# Patient Record
Sex: MALE | Race: BLACK OR AFRICAN AMERICAN | Employment: FULL TIME | URBAN - METROPOLITAN AREA
[De-identification: names, ages, dates, MRNs, and addresses within clinical notes are randomized per-mention and may not be internally consistent; named-entity substitution may affect disease eponyms.]

---

## 2017-09-01 ENCOUNTER — OFFICE VISIT (OUTPATIENT)
Dept: SURGERY | Age: 47
End: 2017-09-01

## 2017-09-01 VITALS
HEIGHT: 69 IN | BODY MASS INDEX: 41.62 KG/M2 | SYSTOLIC BLOOD PRESSURE: 150 MMHG | HEART RATE: 103 BPM | TEMPERATURE: 98.3 F | RESPIRATION RATE: 18 BRPM | OXYGEN SATURATION: 98 % | WEIGHT: 281 LBS | DIASTOLIC BLOOD PRESSURE: 90 MMHG

## 2017-09-01 DIAGNOSIS — G47.33 OSA TREATED WITH BIPAP: ICD-10-CM

## 2017-09-01 DIAGNOSIS — K44.9 HIATAL HERNIA WITH GERD: ICD-10-CM

## 2017-09-01 DIAGNOSIS — K21.9 CHRONIC GERD: ICD-10-CM

## 2017-09-01 DIAGNOSIS — K21.9 HIATAL HERNIA WITH GERD: ICD-10-CM

## 2017-09-01 DIAGNOSIS — I10 ESSENTIAL HYPERTENSION: ICD-10-CM

## 2017-09-01 DIAGNOSIS — E66.01 MORBID OBESITY WITH BMI OF 40.0-44.9, ADULT (HCC): Primary | ICD-10-CM

## 2017-09-01 RX ORDER — DEXLANSOPRAZOLE 60 MG/1
60 CAPSULE, DELAYED RELEASE ORAL
COMMUNITY

## 2017-09-01 RX ORDER — CARVEDILOL 12.5 MG/1
12.5 TABLET ORAL
COMMUNITY
End: 2018-06-14

## 2017-09-01 RX ORDER — SERTRALINE HYDROCHLORIDE 100 MG/1
100 TABLET, FILM COATED ORAL DAILY
COMMUNITY

## 2017-09-01 RX ORDER — FLUTICASONE PROPIONATE 50 MCG
2 SPRAY, SUSPENSION (ML) NASAL DAILY
COMMUNITY
End: 2018-06-14

## 2017-09-01 RX ORDER — RANITIDINE 150 MG/1
150 TABLET, FILM COATED ORAL
COMMUNITY
End: 2018-06-14

## 2017-09-01 RX ORDER — HYDROCHLOROTHIAZIDE 25 MG/1
25 TABLET ORAL
COMMUNITY
End: 2018-07-18

## 2017-09-01 RX ORDER — ALPRAZOLAM 0.5 MG/1
0.5 TABLET ORAL
COMMUNITY
End: 2018-06-14

## 2017-09-01 RX ORDER — LISINOPRIL 40 MG/1
40 TABLET ORAL
Status: ON HOLD | COMMUNITY
End: 2018-07-17

## 2017-09-01 RX ORDER — ALLOPURINOL 300 MG/1
300 TABLET ORAL DAILY
COMMUNITY

## 2017-09-01 RX ORDER — TRAZODONE HYDROCHLORIDE 100 MG/1
100 TABLET ORAL
COMMUNITY

## 2017-09-01 RX ORDER — SIMVASTATIN 10 MG/1
10 TABLET, FILM COATED ORAL DAILY
COMMUNITY

## 2017-09-01 RX ORDER — NIFEDIPINE 90 MG/1
90 TABLET, FILM COATED, EXTENDED RELEASE ORAL
COMMUNITY
End: 2018-06-14

## 2017-09-01 RX ORDER — GUAIFENESIN, PSEUDOEPHEDRINE HYDROCHLORIDE 600; 60 MG/1; MG/1
1 TABLET, EXTENDED RELEASE ORAL EVERY 12 HOURS
COMMUNITY
End: 2018-06-14

## 2017-09-01 NOTE — PROGRESS NOTES
HISTORY OF PRESENT ILLNESS  Matteo Todd is a 40 y.o. male. HPI   Chief Complaint   Patient presents with    New Patient     interested in gastric bypass     Camille Eisenmenger is a 40 y.o. male that presents today for evaluation for weight loss surgery. He has attended an informational seminar and has been researching for several years. He was evaluated and went through the process in [place], but it was not covered by his insurance. He \"thinks\" his sister has had WLS, but does not have much contact with her. He has several friends and co-workers that have had the surgery. He has had a prior Nissen fundoplication and still has terrible GERD. He is interested in Malabsorptive gastric bypass for treatment of morbid obesity. Body mass index is 41.5 kg/(m^2).     \"Chubby\" as a child   Age 13 \"packed it on\"   Joined the [employer] off me\"; left after 6 years     Max weight loss about 45 lbs with low carb plan   High weight  295 lbs (2011)  Low weight  172 lbs (in the [employer])    3 meals per day; uses [employer] vs eating out; struggles with portions   Drinks mostly diet soda, sugar free add on, coffee 20 oz, water   Patient Active Problem List   Diagnosis Code    Morbid obesity with BMI of 40.0-44.9, adult ([facility] E66.01, Z68.41    Chronic GERD K21.9     Past Medical History:   Diagnosis Date    Anxiety     Fine esophagus     last EGD 2015     Dyspepsia and other specified disorders of function of stomach     Hypertension     CHONG (obstructive sleep apnea)     on BiPap     Past Surgical History:   Procedure Laterality Date    HX GI      [place] fundoplication Dr. Darylene Needles in [address]  2009    hiatal    HX ORTHOPAEDIC  2004    left ankle reconstruction    HX ORTHOPAEDIC  2005    left pinkie    HX RHINOPLASTY       Social History     Social History    Marital status:      Spouse name: N/A    Number of children: 2    Years of education: N/A     Occupational History    contract spec      fed [employer] / some work from home      Social History Main Topics    Smoking status: Never Smoker    Smokeless tobacco: Never Used    Alcohol use Yes      Comment: 1 x month or less     Drug use: No    Sexual activity: Yes     Partners: Female     Other Topics Concern    Not on file     Social History Narrative    In the home with spouse         Family History   Problem Relation Age of Onset    Cancer Mother     Diabetes Mother     Hypertension Mother     Hypertension Father        Current Outpatient Prescriptions:     allopurinol (ZYLOPRIM) 300 mg tablet, Take 300 mg by mouth., Disp: , Rfl:     lisinopril (PRINIVIL, ZESTRIL) 40 mg tablet, Take 40 mg by mouth., Disp: , Rfl:     carvedilol (COREG) 12.5 mg tablet, Take 12.5 mg by mouth., Disp: , Rfl:     NIFEdipine ER (ADALAT CC) 90 mg ER tablet, Take 90 mg by mouth., Disp: , Rfl:     hydroCHLOROthiazide (HYDRODIURIL) 25 mg tablet, Take 25 mg by mouth., Disp: , Rfl:     Dexlansoprazole 60 mg CpDB, Take 60 mg by mouth., Disp: , Rfl:     ALPRAZolam (XANAX) 0.5 mg tablet, Take 0.5 mg by mouth., Disp: , Rfl:     simvastatin (ZOCOR) 10 mg tablet, Take 10 mg by mouth., Disp: , Rfl:     sertraline (ZOLOFT) 100 mg tablet, Take 100 mg by mouth., Disp: , Rfl:     traZODone (DESYREL) 100 mg tablet, Take 100 mg by mouth., Disp: , Rfl:     PSEUDOEPHEDRINE-guaiFENesin (MUCINEX D)  mg per tablet, Take 1 Tab by mouth every twelve (12) hours. , Disp: , Rfl:     fluticasone (FLONASE) 50 mcg/actuation nasal spray, 2 Sprays by Both Nostrils route daily. , Disp: , Rfl:     raNITIdine (ZANTAC) 150 mg tablet, Take 150 mg by mouth nightly., Disp: , Rfl:   No Known Allergies  VERA Larson    Review of Systems   Constitutional: Positive for malaise/fatigue (always tired ). HENT: Positive for congestion, sinus pain and tinnitus. Negative for ear discharge, ear pain, hearing loss, nosebleeds and sore throat.     Eyes: Positive for blurred vision (glasses ). Negative for double vision, photophobia, pain, discharge and redness. Respiratory: Positive for cough (during sleep ) and shortness of breath (cause I am out of shape ). Negative for hemoptysis, sputum production and wheezing. On BiPap, but issues with sinus and can only use 3x/ week currently    Cardiovascular: Positive for chest pain (r/t GERD ). Negative for palpitations and leg swelling. Gastrointestinal: Positive for abdominal pain (with food stuck feeling 1x month ), diarrhea (\"just me with anxiety\" ), heartburn and vomiting (regurgitates if \"stuck in throat\"). Negative for blood in stool, constipation, melena and nausea. Dysphagia with apples, breads, pasta, rice   Had EGD about 2 years ago and has been dilated in the past.  Was told Nissen had \"slipped\" and he has recurrent hiatal hernia with history of Fine's    Genitourinary: Negative. Musculoskeletal: Positive for back pain (low back ), joint pain (weight bearing joints, L>R; injured arch left foot ) and myalgias. Negative for falls and neck pain. Skin: Rash: on thighs and dry skin    Neurological: Positive for dizziness (usually related to sinuses and more vertigo ), tingling (depends on \"how I sit\") and headaches (frequent between tension, BP and sinuses ). Negative for seizures and loss of consciousness. Endo/Heme/Allergies: Negative. Psychiatric/Behavioral: Positive for depression. Negative for hallucinations, memory loss, substance abuse and suicidal ideas. The patient is nervous/anxious (some PTSD, work stress ) and has insomnia (trazodome helps ). Physical Exam   Constitutional: He is oriented to person, place, and time. No distress. AA male unaccompanied   /90 (BP 1 Location: Left arm, BP Patient Position: Sitting)  Pulse (!) 103  Temp 98.3 °F (36.8 °C)  Resp 18  Ht 5' 9\" (1.753 m)  Wt 281 lb (127.5 kg)  SpO2 98%  BMI 41.5 kg/m2     Neck: Neck supple. No thyromegaly present. Cardiovascular: Regular rhythm and normal heart sounds. Pulmonary/Chest: Effort normal and breath sounds normal. No respiratory distress. Abdominal: Soft. Bowel sounds are normal. He exhibits no distension. There is no tenderness. Well healed laparoscopy scars    Musculoskeletal: He exhibits no edema. Ambulating independently    Neurological: He is alert and oriented to person, place, and time. Skin: Skin is warm and dry. He is not diaphoretic. Psychiatric: He has a normal mood and affect. Very pleasant and informed        ASSESSMENT and PLAN    ICD-10-CM ICD-9-CM    1. Morbid obesity with BMI of 40.0-44.9, adult ([facility] E66.01 278.01     Z68.41 V85.41    2. BMI 40.0-44.9, adult ([facility] Z68.41 V85.41    3. Hiatal hernia with GERD K21.9 530.81     K44.9 553.3    4. CHONG treated with BiPAP G47.33 327.23    5. Essential hypertension I10 401.9    6. Chronic GERD K21.9 530.81      Anthony Gurjit meets criteria established by the NIH. Without weight reduction, co-morbidities will escalate as well as risk of early mortality. Recommendation is patient could be served with surgical weight reduction, the procedure of Malabsorptive gastric bypass for treatment of morbid obesity. With his history of Nissen fundoplication, GERD and Barretts, gastric bypass is his preferred and recommended procedure. I explained to the patient differences between laparoscopic gastric bypass and sleeve gastrectomy procedures. Patient has attended one our informational meeting and has seen our educational materials. Patient desires to have surgery with Koko Garcia MD.    The procedure of divided gastric bypass with Anna-en-Y gastrojejunostomy was explained to the patient including the four parts of the operation- 1) loss of appetite, 2) the restrictive phases, 3) the dumping phase, 4) mild malabsorption from the diversion of pancreatic or biliary enzymes.  Informed consent was obtained concerning the potential morbidities and mortality of the operation including anastomotic leak, pulmonary embolism, deep vein thrombosis, bleeding, staple- line disruption, stomal stenosis or dilatation, inadequate weight loss, and requirement for life-long vitamin intake. Further information was given concerning a three-day hospitalization with at least one or more nights in a special care unit, protein- enriched liquified diet for two-thee weeks, convalescence for three to six weeks. Information was provided concerning the team approach anesthesia, nursing, and dietary. Pneumatic stockings, Heparin or Lovenox, and wound care management techniques were explained. Abdominal pain, nausea and/or vomiting may occur if eating too fast, too much, or not chewing well enough. With sweets or high fat ingestion, dumping may occur. It was further stressed the approximately three to five percent of patients require endoscopic balloon dilatation of the staple line. Furthermore, a clear discussion of the imperfections of the operation was discussed including the fact that it not effective in every patient because of patient non-compliance and/or mechanical failure. It was hoped, however, that at approaching a ninety percent level, the patients can achieve a mean of seventy percent loss of excess body weight. This is determined partially by patient compliance and exercise as well as making wise choices for the diet. Recommendations:    _x__ Nutrition Evaluation    _x__ Psychological Evaluation    ___ Cardiac Evaluation    ___ Pulmonary Evaluation    ___ Sleep Medicine     ___ [facility]     ___ Committee    _x__ UGI    _x__ EGD     I have reinforced without lifestyle change and behavior modification, Faustina Baumgarten would not achieve his weight loss goals. I reviewed risks and complications associated with each procedure. I discussed  a diet high in protein, low-fat, low- sugar, limited carbohydrates, and discontinuing use of carbonated beverages. Also discussed physical activity and exercises. I have answered all questions, they wish to proceed. Stuart Rushing verbalized understanding and questions were answered to the best of my knowledge and ability. Gastric Bypass educational materials were provided. 51 minutes spent in face to face with J Carlos Walsh > 50% counseling.

## 2017-09-01 NOTE — PATIENT INSTRUCTIONS
Melonie Arias, our surgical coordinator will contact you about the 3 month diet program with the dietician Colette Patel RD)    You will be contacted about an UGI and upper endoscopy to look at your anatomy     You can schedule the psychological evaluation at any time         Learning About Bariatric Surgery  What is bariatric surgery? Bariatric surgery is surgery to help you lose weight. This type of surgery is only used for people who are very overweight and have not been able to lose weight with diet and exercise. This surgery makes the stomach smaller. Some types of surgery also change the connection between your stomach and intestines. How is bariatric surgery done? Bariatric surgery may be either \"open\" or \"laparoscopic. \" Open surgery is done through a large cut (incision) in the belly. Laparoscopic surgery is done through several small cuts. The doctor puts a lighted tube, or scope, and other surgical tools through small cuts in your belly. The doctor is able to see your organs with the scope. There are different types of bariatric surgery. Gastric sleeve surgery  The surgery is usually done through several small incisions in the belly. The doctor removes more than half of your stomach. This leaves a thin sleeve, or tube, that is about the size of a banana. Because part of your stomach has been removed, this can't be reversed. Anna-en-Y gastric bypass surgery  Anna-en-Y (say \"mono-en-why\") surgery changes the connection between the stomach and the intestines. The doctor separates a section of your stomach from the rest of your stomach. This makes a small pouch. The new pouch will hold the food you eat. The doctor connects the stomach pouch to the middle part of the small intestine. Gastric banding surgery  The surgery is usually done through several small incisions in the belly. The doctor wraps a band around the upper part of the stomach. This creates a small pouch.  The small size of the pouch means that you will get full after you eat just a small amount of food. The doctor can inflate or deflate the band to adjust the size. This lets the doctor adjust how quickly food passes from the new pouch into the stomach. It does not change the connection between the stomach and the intestines. What can you expect after the surgery? You may stay in the hospital for one or more days after the surgery. How long you stay depends on the type of surgery you had. Most people need 2 to 4 weeks before they are ready to get back to their usual routine. For the first 2 to 6 weeks after surgery, you probably will need to follow a liquid or soft diet. Bit by bit, you will be able to eat more solid foods. Your doctor may advise you to work with a dietitian. This way you'll be sure to get enough protein, vitamins, and minerals while you are losing weight. Even with a healthy diet, you may need to take vitamin and mineral supplements. After surgery, you will not be able to eat very much at one time. You will get full quickly. Try not to eat too much at one time or eat foods that are high in fat or sugar. If you do, you may vomit, get stomach pain, or have diarrhea. You probably will lose weight very quickly in the first few months after surgery. As time goes on, your weight loss will slow down. You will have regular doctor visits to check how you are doing. Think of bariatric surgery as a tool to help you lose weight. It isn't an instant fix. You will still need to eat a healthy diet and get regular exercise. This will help you reach your weight goal and avoid regaining the weight you lose. Follow-up care is a key part of your treatment and safety. Be sure to make and go to all appointments, and call your doctor if you are having problems. It's also a good idea to know your test results and keep a list of the medicines you take. Where can you learn more? Go to http://mervin-ashish.info/.   Enter G469 in the search box to learn more about \"Learning About Bariatric Surgery. \"  Current as of: October 13, 2016  Content Version: 11.3  © 8488-5456 [employer]. Care instructions adapted under license by [employer] (which disclaims liability or warranty for this information). If you have questions about a medical condition or this instruction, always ask your healthcare professional. [address] any warranty or liability for your use of this information.

## 2017-09-01 NOTE — MR AVS SNAPSHOT
Visit Information Date & Time Provider Department Dept. Phone Encounter #  
 9/1/2017  8:40 AM Michael De Leon NP [place] [address] [phone] [id number] Upcoming Health Maintenance Date Due DTaP/Tdap/Td series (1 - Tdap) 9/14/1992 INFLUENZA AGE 9 TO ADULT 8/1/2017 Allergies as of 9/1/2017  Review Complete On: 9/1/2017 By: Michael De Leon NP No Known Allergies Current Immunizations  Never Reviewed No immunizations on file. Not reviewed this visit You Were Diagnosed With   
  
 Codes Comments Morbid obesity with BMI of 40.0-44.9, adult ([facility]    -  Primary ICD-10-CM: E66.01, Z68.41 
ICD-9-CM: 278.01, V85.41 Hiatal hernia with GERD     ICD-10-CM: K21.9, K44.9 ICD-9-CM: 530.81, 553.3 CHONG treated with BiPAP     ICD-10-CM: G47.33 
ICD-9-CM: 327.23 Essential hypertension     ICD-10-CM: I10 
ICD-9-CM: 401.9 Vitals BP Pulse Temp Resp Height(growth percentile) Weight(growth percentile) 150/90 (BP 1 Location: Left arm, BP Patient Position: Sitting) (!) 103 98.3 °F (36.8 °C) 18 5' 9\" (1.753 m) 281 lb (127.5 kg) SpO2 BMI Smoking Status 98% 41.5 kg/m2 Never Smoker Vitals History BMI and BSA Data Body Mass Index Body Surface Area 41.5 kg/m 2 2.49 m 2 Your Updated Medication List  
  
   
This list is accurate as of: 9/1/17  9:50 AM.  Always use your most recent med list.  
  
  
  
  
 allopurinol 300 mg tablet Commonly known as:  Ardeen San Take 300 mg by mouth. ALPRAZolam 0.5 mg tablet Commonly known as:  Josephine Corti Take 0.5 mg by mouth. carvedilol 12.5 mg tablet Commonly known as:  Jose Maldonado Take 12.5 mg by mouth. Dexlansoprazole 60 mg Cpdb Take 60 mg by mouth. FLONASE 50 mcg/actuation nasal spray Generic drug:  fluticasone 2 Sprays by Both Nostrils route daily. hydroCHLOROthiazide 25 mg tablet Commonly known as:  HYDRODIURIL  
 Take 25 mg by mouth.  
  
 lisinopril 40 mg tablet Commonly known as:  Valene Pencil Take 40 mg by mouth. MUCINEX D  mg per tablet Generic drug:  PSEUDOEPHEDRINE-guaiFENesin Take 1 Tab by mouth every twelve (12) hours. NIFEdipine ER 90 mg ER tablet Commonly known as:  ADALAT CC Take 90 mg by mouth. raNITIdine 150 mg tablet Commonly known as:  ZANTAC Take 150 mg by mouth nightly. sertraline 100 mg tablet Commonly known as:  ZOLOFT Take 100 mg by mouth. simvastatin 10 mg tablet Commonly known as:  ZOCOR Take 10 mg by mouth. traZODone 100 mg tablet Commonly known as:  Illene Dus Take 100 mg by mouth. Patient Instructions Carli Perry, our surgical coordinator will contact you about the 3 month diet program with the dietician Martin Patel RD) You will be contacted about an UGI and upper endoscopy to look at your anatomy You can schedule the psychological evaluation at any time Learning About Bariatric Surgery What is bariatric surgery? Bariatric surgery is surgery to help you lose weight. This type of surgery is only used for people who are very overweight and have not been able to lose weight with diet and exercise. This surgery makes the stomach smaller. Some types of surgery also change the connection between your stomach and intestines. How is bariatric surgery done? Bariatric surgery may be either \"open\" or \"laparoscopic. \" Open surgery is done through a large cut (incision) in the belly. Laparoscopic surgery is done through several small cuts. The doctor puts a lighted tube, or scope, and other surgical tools through small cuts in your belly. The doctor is able to see your organs with the scope. There are different types of bariatric surgery. Gastric sleeve surgery The surgery is usually done through several small incisions in the belly. The doctor removes more than half of your stomach.  This leaves a thin sleeve, or tube, that is about the size of a banana. Because part of your stomach has been removed, this can't be reversed. Anna-en-Y gastric bypass surgery Anna-en-Y (say \"mono-en-why\") surgery changes the connection between the stomach and the intestines. The doctor separates a section of your stomach from the rest of your stomach. This makes a small pouch. The new pouch will hold the food you eat. The doctor connects the stomach pouch to the middle part of the small intestine. Gastric banding surgery The surgery is usually done through several small incisions in the belly. The doctor wraps a band around the upper part of the stomach. This creates a small pouch. The small size of the pouch means that you will get full after you eat just a small amount of food. The doctor can inflate or deflate the band to adjust the size. This lets the doctor adjust how quickly food passes from the new pouch into the stomach. It does not change the connection between the stomach and the intestines. What can you expect after the surgery? You may stay in the hospital for one or more days after the surgery. How long you stay depends on the type of surgery you had. Most people need 2 to 4 weeks before they are ready to get back to their usual routine. For the first 2 to 6 weeks after surgery, you probably will need to follow a liquid or soft diet. Bit by bit, you will be able to eat more solid foods. Your doctor may advise you to work with a dietitian. This way you'll be sure to get enough protein, vitamins, and minerals while you are losing weight. Even with a healthy diet, you may need to take vitamin and mineral supplements. After surgery, you will not be able to eat very much at one time. You will get full quickly. Try not to eat too much at one time or eat foods that are high in fat or sugar. If you do, you may vomit, get stomach pain, or have diarrhea. You probably will lose weight very quickly in the first few months after surgery. As time goes on, your weight loss will slow down. You will have regular doctor visits to check how you are doing. Think of bariatric surgery as a tool to help you lose weight. It isn't an instant fix. You will still need to eat a healthy diet and get regular exercise. This will help you reach your weight goal and avoid regaining the weight you lose. Follow-up care is a key part of your treatment and safety. Be sure to make and go to all appointments, and call your doctor if you are having problems. It's also a good idea to know your test results and keep a list of the medicines you take. Where can you learn more? Go to http://mervin-ashish.info/. Enter G469 in the search box to learn more about \"Learning About Bariatric Surgery. \" Current as of: October 13, 2016 Content Version: 11.3 © 5591-3975 [employer]. Care instructions adapted under license by [employer] (which disclaims liability or warranty for this information). If you have questions about a medical condition or this instruction, always ask your healthcare professional. [place] [address] any warranty or liability for your use of this information. Introducing [facility]! Marilee Giordano introduces VIPAAR patient portal. Now you can access parts of your medical record, email your doctor's office, and request medication refills online. 1. In your internet browser, go to [email or web address]. [email or web address] REBATE 2. Click on the First Time User? Click Here link in the Sign In box. You will see the New Member Sign Up page. 3. Enter your VIPAAR Access Code exactly as it appears below. You will not need to use this code after youve completed the sign-up process. If you do not sign up before the expiration date, you must request a new code.  
 
· VIPAAR Access Code: [email or web address] 
 Expires: 11/30/2017  8:42 AM 
 
4. Enter the last four digits of your Social Security Number (xxxx) and Date of Birth (mm/dd/yyyy) as indicated and click Submit. You will be taken to the next sign-up page. 5. Create a NanoLumens ID. This will be your [employer] login ID and cannot be changed, so think of one that is secure and easy to remember. 6. Create a [employer] password. You can change your password at any time. 7. Enter your Password Reset Question and Answer. This can be used at a later time if you forget your password. 8. Enter your e-mail address. You will receive e-mail notification when new information is available in [address] 9. Click Sign Up. You can now view and download portions of your medical record. 10. Click the Download Summary menu link to download a portable copy of your medical information. If you have questions, please visit the Frequently Asked Questions section of the [employer] website. Remember, [employer]Lumens is NOT to be used for urgent needs. For medical emergencies, dial 911. Now available from your iPhone and Android! Please provide this summary of care documentation to your next provider. Your primary care clinician is listed as Phys Other. If you have any questions after today's visit, please call [phone].

## 2017-09-07 ENCOUNTER — HOSPITAL ENCOUNTER (OUTPATIENT)
Dept: GENERAL RADIOLOGY | Age: 47
Discharge: HOME | End: 2017-09-07
Attending: NURSE PRACTITIONER
Payer: COMMERCIAL

## 2017-09-07 DIAGNOSIS — K21.9 HIATAL HERNIA WITH GERD: ICD-10-CM

## 2017-09-07 DIAGNOSIS — K44.9 HIATAL HERNIA WITH GERD: ICD-10-CM

## 2017-09-07 DIAGNOSIS — K21.9 CHRONIC GERD: ICD-10-CM

## 2017-09-07 PROCEDURE — 74241 XR UPPER GI W KUB/ BA SWALLOW: CPT

## 2017-11-07 ENCOUNTER — CLINICAL SUPPORT (OUTPATIENT)
Dept: SURGERY | Age: 47
End: 2017-11-07

## 2017-11-07 VITALS — WEIGHT: 295 LBS | BODY MASS INDEX: 43.56 KG/M2

## 2017-11-07 DIAGNOSIS — E66.01 MORBID OBESITY WITH BMI OF 40.0-44.9, ADULT (HCC): Primary | ICD-10-CM

## 2017-11-07 NOTE — PROGRESS NOTES
Pre-operative Bariatric Nutrition Evaluation (1 of 3)     Date: 2017   Adolph Whitehead M.D. Name: Rob Roth  :  1970  Age:  56  Gender: Male   Type of Surgery: [x]           Gastric Bypass  []           LAGB  []           Sleeve Gastrectomy    ASSESSMENT:    Past Medical History:HTN, GERD, high cholesterol, sleep apnea, anxiety, gout      Medications/Supplements:   Prior to Admission medications    Medication Sig Start Date End Date Taking? Authorizing Provider   allopurinol (ZYLOPRIM) 300 mg tablet Take 300 mg by mouth. Historical Provider   lisinopril (PRINIVIL, ZESTRIL) 40 mg tablet Take 40 mg by mouth. Historical Provider   carvedilol (COREG) 12.5 mg tablet Take 12.5 mg by mouth. Historical Provider   NIFEdipine ER (ADALAT CC) 90 mg ER tablet Take 90 mg by mouth. Historical Provider   hydroCHLOROthiazide (HYDRODIURIL) 25 mg tablet Take 25 mg by mouth. Historical Provider   Dexlansoprazole 60 mg CpDB Take 60 mg by mouth. Historical Provider   ALPRAZolam Ondina Drone) 0.5 mg tablet Take 0.5 mg by mouth. Historical Provider   simvastatin (ZOCOR) 10 mg tablet Take 10 mg by mouth. Historical Provider   sertraline (ZOLOFT) 100 mg tablet Take 100 mg by mouth. Historical Provider   traZODone (DESYREL) 100 mg tablet Take 100 mg by mouth. Historical Provider   PSEUDOEPHEDRINE-guaiFENesin [facility] D)  mg per tablet Take 1 Tab by mouth every twelve (12) hours. Historical Provider   fluticasone (FLONASE) 50 mcg/actuation nasal spray 2 Sprays by Both Nostrils route daily. Historical Provider   raNITIdine (ZANTAC) 150 mg tablet Take 150 mg by mouth nightly. Historical Provider       Food Allergies/Intolerances:none     Anthropometrics:    Ht:69\"    Wt: 295#    IBW: 145#    %IBW: 203%     BMI:43    Category: obesity III     Reported wt history:Pt presents today for pre-op nutrition evaluation for wt loss surgery.  Reports lowest adult BW was 175# at age 25. Wt has fluctuated up and down over the years with varying levels of physical activity with being in and out of the [employer] and injuries that have limited physical activity. Current wt is highest adult BW. Has attempted wt loss through various diets. Most significant wt loss was 50# on [place]. States he was unable to maintain wt loss d/t restrictive nature of the diet and caused high cholesterol from choosing high fat proteins. Pt is now seeking approval for wt loss surgery d/t impact his wt is having on his health (GERD, sleep apnea, ankle pain). Pt will need to complete 90 days of supervised weight loss for insurance requirements. Exercise/Physical Activity:mostly walking 10,000 steps per day - recently none d/t being on vacation     Reported Diet History:Atkins, Weight Watchers, diet pills, liquid diets/juicing, fasting, exercise     24 Hour Diet Recall  Breakfast  Yogurt, banana or toast; sometimes skips    Lunch  [place] and chips from cafeteria at work or goes home for lunch some days    Dinner  Chicken, veggies, starchy foods   Snacks  Chips, cheese and crackers; sometimes ice cream    Beverages  Water, diet soda      A pre-op nutrition checklist was reviewed. Patient checked off 5 of 15 items. Environment/Psychosocial/Support:pt's wife is main support system; pt knows a few people with weight loss surgery including his sister    NUTRITION DIAGNOSIS:  1. Self-monitoring deficit r/t previous lack of value for this change evidenced by pt skips meals. 2. Physical inactivity r/t ankle pain that limits activity evidenced by pt with on current exercise regimen. Pt walks daily for work but lacks intentional exercise regimen. 3. Excessive energy intake r/t heavy reliance on eating out evidenced by diet recall that reveals average restaurant meals about 4 times per week.        NUTRITION INTERVENTION:  Pt educated on nutrition recommendations for weight loss surgery, specifically gastric bypass. Instructed on consuming 3 meals per day starting now. Use the balanced plate method to plan meals, include 3 oz of lean source of protein, 1/2 cup whole grains, unlimited non-starchy vegetables, 1/2 cup fruit and 1 serving of low fat dairy. Utilize handouts listing healthy snack and meal ideas to limit restaurant meals. After surgery measure all meals to 1/2 cup. Each meal will contain a 1/4 cup lean protein and 1/4 cup fruit, non-starchy vegetable or starch (limiting to once per day). Aim for 60 g protein per day. Sip on 48-64 oz of sugar free, calorie free, non-carbonated beverages each day. Do not use a straw. Do not consume beverages 30 minutes before, during or 30 minutes after meals. Read all nutrition labels. Demonstrated and emphasized identifying serving size, total fat, sugar and protein content. Defined low fat as </= 3 g per serving. Discussed lean and extra lean sources of protein. Provided list of low fat cooking methods. Avoid foods with sugar listed in the first 3 ingredients and >/15 g sugar per serving. Excess sugar/fat intake may lead to dumping syndrome. Discussed signs and symptoms of dumping syndrome. Practice mindful eating habits; take small bites, chew thoroughly, avoid distractions, utilize hunger/fullness scale. Consume meals over 20-30 minutes. Attend Bariatric Support Group and increase physical activity (approved per MD) for long term weight maintenance. NUTRITION MONITORING AND EVALUATION:    The following goals were established with patient;  1. Eat 3 meals a day. Do not skip meals. Use protein shakes in place of skipping meals but choose solid food first if possible. 2. Increase physical activity. Pt interested in [facility] Prep program. Will send referral PRN. 3. Decrease diet soda intake and eventually eliminate prior to surgery. 4. Decrease reliance on cafeteria and restaurant meals.  Continue to cook/prepare more meals at home and packing meals to take to work or go home for lunch. 5. Follow up with RD for supervised weight loss requirements. Specific tips and techniques to facilitate compliance with above recommendations were provided and discussed. Pt was strongly encourage to begin making necessary changes now, attend support group, and re-visit the dietitian prn. Nutrition evaluation indicates lifestyle changes are necessary to better comply with general nutrition guidelines. Goals set and recommendations made. Will continue to assess as pt works to complete supervised wt loss requirements. If further details are desired please feel free to contact me at [phone]. This phone number was also provided to the patient for any further questions or concerns.            Vito Liu RD

## 2017-12-05 ENCOUNTER — CLINICAL SUPPORT (OUTPATIENT)
Dept: SURGERY | Age: 47
End: 2017-12-05

## 2017-12-05 DIAGNOSIS — E66.01 MORBID OBESITY WITH BMI OF 40.0-44.9, ADULT (HCC): Primary | ICD-10-CM

## 2017-12-06 VITALS — WEIGHT: 294 LBS | BODY MASS INDEX: 43.42 KG/M2

## 2017-12-06 NOTE — PROGRESS NOTES
[id number] [facility] at [facility]  Supervised Weight Loss     Date:   2017    Patient's Name: Rob Roth  : 1970    Insurance:  [facility]          Session: 2 of  3  Surgery: Gastric Bypass  Surgeon:  Ranjan Robles M.D. Height: 69\"   Weight:    294      Lbs. BMI: 43   Pounds Lost since last month: 1#               Pounds Gained since last month: 0    Starting Weight: 295#   Previous Months Weight: 295#  Overall Pounds Lost: 1#  Overall Pounds Gained: 0    Other Pertinent Information: n/a     Smoking Status:  none  Alcohol Intake: 1 drink, once a month     I have reviewed with patient the guidelines of the supervised weight loss class. Patient understands the expectations of some weight loss during the weight loss trial.  Patient understands that weight gain could delay the process. I have also expressed to patient that classes need to be consecutive. Missing a class may subject patient to have to start their trial over. Patient has received this information in writing. Changes that patient has made since last month include:  None reported. Eating Habits and Behaviors      Today we reviewed the general diet principles for weight loss surgery. An education lesson was provided specific to protein. We discussed why protein is important after surgery, how much protein is needed per day (60-80 grams) and how to achieve protein goals. We discussed various food sources of protein and how many grams of protein per serving. We discussed how to use protein supplements, powders and shakes and how to purchase these products. Emphasis was placed on the importance of eating 3 meals a day to help promote weight loss and achieve protein goals. We talked about healthy methods of cooking and cooking tips to help with better tolerance of protein foods after surgery. Patient's current diet habits include: eating 3 meals a day. Snacking on fruit and carrots. Eating ice cream once a week. Eating a mixture of baked, grilled, broiled and some fried foods. Eating out is 1-3 times per week. Drinking 50-60 oz water, 10-20 oz unsweetened tea, 1 diet soda per day and 1 Crystal Light daily. Sometimes emotional eating and using meditation and walking as non-food coping strategies. Denies situational eating. Packing meals when away from home. Eating most meals in front of the television and takes 15-20 minutes to finish the meal. Reports food choices, portion sizes are biggest barriers to weight loss at this time. Physical Activity/Exercise  During class we discussed the importance of increasing daily physical activity and beginning to develop an exercise regimen/routine. We discussed that exercise is an important part of long term weight loss. Comments:  During class, I discussed with patient the importance of getting into an exercise routine. Patient is currently walking for activity. Patient has been encouraged to maintain and increase as tolerated. Behavior Modification       Comments: We discussed the importance of eating mindfully after weight loss surgery to prevent food intolerance and prevent weight regain. We talked about how to eat more mindfully and identify emotional eating triggers. Tips and recommendations for how to make these changes were provided. Patient was encouraged to keep a food journal and record what they were taking in daily. Overall Assessment: Patient demonstrates appropriate lifestyle changes this first month evidenced by reported changes and weight loss. Will continue to assess as pt works to complete supervised weight loss requirements. Patient-Set Goals:   1. Nutrition - make half my meal protein  2. Exercise - walk 50k steps per week  3.  Behavior -pay attention to serving sizes     Lynn Hatfield RD  12/6/2017

## 2018-01-09 ENCOUNTER — CLINICAL SUPPORT (OUTPATIENT)
Dept: SURGERY | Age: 48
End: 2018-01-09

## 2018-01-09 DIAGNOSIS — E66.01 MORBID OBESITY WITH BMI OF 40.0-44.9, ADULT (HCC): Primary | ICD-10-CM

## 2018-01-10 VITALS — BODY MASS INDEX: 42.97 KG/M2 | WEIGHT: 291 LBS

## 2018-01-10 NOTE — PROGRESS NOTES
[id number] [facility] Surgery at [facility]  Supervised Weight Loss     Date:   1/10/2018    Patient's Name: Michelle Burdick  : 1970    Insurance:  [employer]          Session: 3 of  3  Surgery: Gastric Bypass  Surgeon:  Leanne Rubi M.D. Height: 69\"   Weight:    291      Lbs. BMI: 42   Pounds Lost since last month: 3#               Pounds Gained since last month: 0    Starting Weight: 295#   Previous Months Weight: 294#  Overall Pounds Lost: 4#  Overall Pounds Gained: 0    Other Pertinent Information: n/a     Smoking Status:  none  Alcohol Intake: none    I have reviewed with patient the guidelines of the supervised weight loss class. Patient understands the expectations of some weight loss during the weight loss trial.  Patient understands that weight gain could delay the process. I have also expressed to patient that classes need to be consecutive. Missing a class may subject patient to have to start their trial over. Patient has received this information in writing. Changes that patient has made since last month include:  Smaller portions, using a food scale, no soda. Eating Habits and Behaviors  Today we reviewed the general diet principles for weight loss surgery. Their plate should be made up of 1/2 coming from non-starchy vegetables, 1/4 coming from lean meat, and 1/4 of their plate coming from carbohydrates, including fruits, starches, or milk. Emphasis was placed on the importance of eating 3 meals a day and aiming for 60 grams of protein per day. I educated the patient on limiting liquid calories and drinking only calorie-free, sugar-free and non-carbonated beverages. We discussed the importance of drinking 64 ounces of fluid per day to prevent dehydration post-operatively. A nutrition education lesson regarding vitamin and mineral supplements was provided and the importance of preventing nutrient deficiencies post-operatively.                        Patient's current diet habits include: eating 3 melas a day. Denies any snacking. Avoiding carbohydrates, sweets, desserts. Eating a mixture of baked, grilled, broiled and some fried foods. Eating out is 1-3 times per week. Drinking 60 oz water, 12 oz diet soda, 12 oz caffeine and 30 oz Crystal Light daily. Denies emotional or situational eating. Packing meals when away from home. Eating most meals in front of the television and takes 10-15 minutes to finish the meal. Reports grazing, night eating, food choices and portion sizes are biggest barriers to weight loss at this time. Physical Activity/Exercise  During class we discussed the importance of increasing daily physical activity and beginning to develop an exercise regimen/routine. We discussed that exercise is an important part of long term weight loss. Comments:  During class, I discussed with patient the importance of getting into an exercise routine. Patient is currently attending [facility]P program 4 times per week for activity. Patient has been encouraged to maintain and increase as tolerated. Behavior Modification       Comments: We discussed the importance of eating mindfully after weight loss surgery to prevent food intolerance and prevent weight regain. We talked about how to eat more mindfully and identify emotional eating triggers. Tips and recommendations for how to make these changes were provided. Patient was encouraged to keep a food journal and record what they were taking in daily. Overall Assessment: Patient demonstrates appropriate lifestyle changes in preparation for weight loss surgery. Will continue to assess as pt works to complete supervised weight loss requirements. Patient-Set Goals:   1. Nutrition - read all food labels   2. Exercise - 3 times per week (1 hour sessions)   3.  Behavior - think of why I am eating before I eat     Yolis Polanco, CADE  1/10/2018

## 2018-02-08 ENCOUNTER — CLINICAL SUPPORT (OUTPATIENT)
Dept: SURGERY | Age: 48
End: 2018-02-08

## 2018-02-08 VITALS — WEIGHT: 286 LBS | BODY MASS INDEX: 42.23 KG/M2

## 2018-02-08 DIAGNOSIS — E66.01 MORBID OBESITY WITH BMI OF 40.0-44.9, ADULT (HCC): Primary | ICD-10-CM

## 2018-02-08 NOTE — PROGRESS NOTES
[id number] [facility] at [facility]  Supervised Weight Loss     Date:   2018    Patient's Name: Soraida Barnes  : 1970    Insurance:  [facility]          Session: 4 of  3  Surgery: Gastric Bypass  Surgeon:  Fiorella Kern M.D. Height: 69\"   Weight:    286      Lbs. BMI: 42   Pounds Lost since last month: 5#               Pounds Gained since last month: 0    Starting Weight: 295#   Previous Months Weight: 291#  Overall Pounds Lost: 9#  Overall Pounds Gained: 0    Other Pertinent Information: n/a     Smoking Status:  none  Alcohol Intake: 1 drink, 3 times per year    I have reviewed with patient the guidelines of the supervised weight loss class. Patient understands the expectations of some weight loss during the weight loss trial.  Patient understands that weight gain could delay the process. I have also expressed to patient that classes need to be consecutive. Missing a class may subject patient to have to start their trial over. Patient has received this information in writing. Changes that patient has made since last month include:  Smaller portions, eating 3 meals a day. Eating Habits and Behaviors  A nutrition and behavior education less and activity was completed by the patient specific to mindful eating behaviors, emotional eating and developing non-food coping strategies for emotional eating. We also reviewed the general diet principles for weight loss surgery. Their plate should be made up of 1/2 coming from non-starchy vegetables, 1/4 coming from lean meat, and 1/4 of their plate coming from carbohydrates, including fruits, starches, or milk. Emphasis was placed on the importance of eating 3 meals a day and aiming for 60 grams of protein per day. I educated the patient on limiting liquid calories and drinking only calorie-free, sugar-free and non-carbonated beverages.  We discussed the importance of drinking 64 ounces of fluid per day to prevent dehydration post-operatively. Patient's current diet habits include: eating 3 meals a day. Snacking crackers, fruit and cheese. Eating refined carbohydrates 2 times per week. Avoiding sweets/desserts. Eating a mixture of baked, grilled, broiled and some fried foods. Eating out is 1-3 times per week. Drinking 50 oz water, 12 oz diet soda, 24 oz coffee and 30 oz Crystal Light. Denies emotional and situational eating. Packing meals when away from home. Eating most meals while watching television and takes 15-20 minutes to finish the meal. Reports grazing, night eating and portion sizes are biggest barriers to weight loss. Physical Activity/Exercise  During class we discussed the importance of increasing daily physical activity and beginning to develop an exercise regimen/routine. We discussed that exercise is an important part of long term weight loss. Comments:  During class, I discussed with patient the importance of getting into an exercise routine. Patient is currently exercising for 1 hour, 3 times per week for activity. Patient has been encouraged to maintain and increase as tolerated. Behavior Modification       Comments: We discussed the importance of eating mindfully after weight loss surgery to prevent food intolerance and prevent weight regain. We talked about how to eat more mindfully and identify emotional eating triggers. Tips and recommendations for how to make these changes were provided. Patient was encouraged to keep a food journal and record what they were taking in daily. Overall Assessment: Patient demonstrates appropriate lifestyle changes evidenced by reported changes and overall weight loss. Continued changes are still indicated specific to carbonated beverages, eating while distracted and cooking methods. Pt has been encouraged to continue to work on these lifestyle changes.  Appears to understand the general nutrition guidelines for weight loss surgery. Appears to be an appropriate candidate at this time. Patient-Set Goals:   1. Nutrition - eating 3 meals a day, planning meals, eat breakfast daily  2. Exercise - 2-3 times per week, scheduling time for exercise, working with   3.  Behavior -pay attention to hunger level, eat before getting too hungry     Josh Simon, CADE  2/8/2018

## 2018-02-20 ENCOUNTER — OFFICE VISIT (OUTPATIENT)
Dept: SURGERY | Age: 48
End: 2018-02-20

## 2018-02-20 VITALS
BODY MASS INDEX: 42.43 KG/M2 | OXYGEN SATURATION: 97 % | WEIGHT: 286.5 LBS | HEART RATE: 106 BPM | RESPIRATION RATE: 20 BRPM | SYSTOLIC BLOOD PRESSURE: 140 MMHG | TEMPERATURE: 98.5 F | HEIGHT: 69 IN | DIASTOLIC BLOOD PRESSURE: 110 MMHG

## 2018-02-20 DIAGNOSIS — E66.01 MORBID OBESITY WITH BMI OF 40.0-44.9, ADULT (HCC): Primary | ICD-10-CM

## 2018-02-20 DIAGNOSIS — I10 ESSENTIAL HYPERTENSION: ICD-10-CM

## 2018-02-20 DIAGNOSIS — K22.70 BARRETT'S ESOPHAGUS WITHOUT DYSPLASIA: ICD-10-CM

## 2018-02-20 DIAGNOSIS — Z98.890 STATUS POST NISSEN FUNDOPLICATION: ICD-10-CM

## 2018-02-20 DIAGNOSIS — K44.9 HIATAL HERNIA: ICD-10-CM

## 2018-02-20 DIAGNOSIS — K21.9 CHRONIC GERD: ICD-10-CM

## 2018-02-20 DIAGNOSIS — G47.33 OSA (OBSTRUCTIVE SLEEP APNEA): ICD-10-CM

## 2018-02-20 RX ORDER — CLONAZEPAM 0.5 MG/1
TABLET ORAL
COMMUNITY
End: 2018-06-14

## 2018-02-20 NOTE — PROGRESS NOTES
1. Have you been to the ER, urgent care clinic since your last visit? Hospitalized since your last visit? No    2. Have you seen or consulted any other health care providers outside of the [address] [place] [address] since your last visit? Include any pap smears or colon screening.  No

## 2018-02-20 NOTE — PATIENT INSTRUCTIONS
Learning About How to Prepare for Weight-Loss Surgery  How can you prepare for weight-loss surgery? Having weight-loss surgery (also called bariatric surgery) is a big step. You can prepare for surgery by having a plan. Your plan may include your goals for losing weight and how to makes changes in your diet, activity, and lifestyle to help raise your chances of success. One way to prepare for surgery is to think about your goal or reason why you want to reach a healthy weight. Do you want to lower your blood pressure, cholesterol, or blood sugar? Do you want to be able to sleep better, play with your kids, or walk around the block? Having a reason can help you stay with your plan and meet your goals. Your weight-loss surgery team can help you meet your goals and get ready for surgery. Bear Valdes work with a team that's trained to help you lose weight and make healthy changes in your life. This team may include:  · A medical doctor or nurse to help manage your care and schedule tests before surgery. · A surgeon who specializes in weight-loss surgery. · A registered dietitian to help you plan meals and make changes in the way you eat. · An exercise specialist to help you be more active and get stronger. · A therapist or counselor to help you learn why you eat and teach you ways to deal with stress and your emotions. Your team will also be there to help you prepare for life after surgery. They will help you adjust to new ways of eating and changes to your body. How will weight-loss surgery affect your life? You have likely thought a lot about how surgery may affect your life-how you will eat, how your body will look, or how you will feel. Some people feel overwhelmed with these changes. But planning can help you prepare for the changes and meet your weight-loss goals. One important step in your plan is to learn about the ways surgery will affect your life. These may include:  · A slimmer you.  You probably will lose weight very quickly in the first few months after surgery. As time goes on, your weight loss will slow down. How much weight you lose depends on what type of surgery you had and how well your new eating and activity plans are working for you. · A new way of eating. Success in reaching and keeping a healthy weight depends on making lifelong changes in how you eat. After surgery, you raise your chances of success if you:  ¨ Eat just a few ounces of food at a time. ¨ Eat very slowly and chew your food to mush. ¨ Don't drink for 30 minutes before you eat, during your meal, and for 30 minutes after you eat. ¨ Are careful about drinking alcohol. ¨ Avoid foods that are high in fat or sugar. ¨ Take vitamin and mineral supplements. · A healthier you. Weight-loss surgery can have some real health benefits. Problems like diabetes, high blood pressure, and sleep apnea may go away-or at least become easier to manage. · A more active you. After surgery, being active on most days of the week will help you reach your weight goal and avoid gaining back the weight you lose. · A lot of extra skin. When you lose weight quickly, you may have a lot of extra skin. That's normal. You can have surgery to remove the extra skin if it bothers you. There are going to be some ups and downs while you get used to these changes. So another way to adjust is to identify who can help support you. Getting support from friends and family can help. And joining a support group for people who have had the surgery can be a big help too, because they know what you're going through. As you know, it's a big decision to have weight-loss surgery. But when you have a plan, you can focus on losing weight and living a healthier life. So what steps can you take to prepare for weight-loss surgery? Will you set some goals? Will you learn about how surgery can affect your life? How about asking family or friends for help? Write out your plan.  Then get ready. Where can you learn more? Go to http://mervin-ashish.info/. Enter I546 in the search box to learn more about \"Learning About How to Prepare for Weight-Loss Surgery. \"  Current as of: October 13, 2016  Content Version: 11.4  © 2108-4515 [employer], [employer]. Care instructions adapted under license by [employer] (which disclaims liability or warranty for this information). If you have questions about a medical condition or this instruction, always ask your healthcare professional. [address] any warranty or liability for your use of this information.

## 2018-03-22 ENCOUNTER — OFFICE VISIT (OUTPATIENT)
Dept: CARDIOLOGY CLINIC | Age: 48
End: 2018-03-22

## 2018-03-22 VITALS
OXYGEN SATURATION: 97 % | HEIGHT: 69 IN | WEIGHT: 291.6 LBS | DIASTOLIC BLOOD PRESSURE: 100 MMHG | HEART RATE: 95 BPM | BODY MASS INDEX: 43.19 KG/M2 | SYSTOLIC BLOOD PRESSURE: 130 MMHG

## 2018-03-22 DIAGNOSIS — E66.01 MORBID OBESITY WITH BMI OF 40.0-44.9, ADULT (HCC): Primary | ICD-10-CM

## 2018-03-22 DIAGNOSIS — Z01.810 PREOP CARDIOVASCULAR EXAM: ICD-10-CM

## 2018-03-22 DIAGNOSIS — I10 ESSENTIAL HYPERTENSION: ICD-10-CM

## 2018-03-22 DIAGNOSIS — G47.33 OSA (OBSTRUCTIVE SLEEP APNEA): ICD-10-CM

## 2018-03-22 NOTE — LETTER
3/22/2018 4:52 PM 
 
Patient:  Annita George YOB: 1970 Date of Visit: 3/22/2018 Dear Abiola Walden MD 
[address] [place] [address] 1987 [facility] [phone] [id number] VIA In Basket 
 : Thank you for referring Mr. Nicholas Dior to me for evaluation/treatment. Below are the relevant portions of my assessment and plan of care. Mr. Shana Lindsay is a 55 yo M with essential hypertension, mixed hyperlipidemia, obesity, referred by Dr. Cheyenne Conti for cardiac evaluation. He is here for preop evaluation prior to weight loss surgery with laparoscopic bypass. From a cardiac standpoint, he denies any prior cardiac issues. From a symptom standpoint, he denies any chest pain, shortness of breath, palpitations, lightheadedness, dizziness or syncope. He does go to the gym three times a week without any restriction. EKG was normal sinus rhythm, heart rate of 95 and nonspecific ST-T wave abnormality. He is compensated on exam with clear lungs. Blood pressure was 130/100 with a heart rate of 95. Soc hx. No tobacco 
Fam hx. No early CAD Assessment and Plan: 1. Preop cardiac evaluation. He is stable with no active cardiac issues. He is low risk for cardiac complications and no additional cardiac evaluation is indicated at this time. 2. Essential hypertension. Blood pressure is mildly elevated here, but overall controlled and no changes made. 3. Mixed hyperlipidemia. Obesity. If you have questions, please do not hesitate to call me. Sincerely, Saintclair Lao, MD

## 2018-03-22 NOTE — MR AVS SNAPSHOT
[address] [facility] [address] [facility] 57 
[phone] Patient: Jos Abdullahi MRN: [id number] FYO:[birth year] Visit Information Date & Time Provider Department Dept. Phone Encounter #  
 3/22/2018  9:40 AM Sara cM MD [facility] [place] Estimable [phone] [id number] Upcoming Health Maintenance Date Due DTaP/Tdap/Td series (1 - Tdap) 9/14/1992 Influenza Age 5 to Adult 8/1/2017 Allergies as of 3/22/2018  Review Complete On: 3/22/2018 By: Sara Mc MD  
 No Known Allergies Current Immunizations  Never Reviewed No immunizations on file. Not reviewed this visit You Were Diagnosed With   
  
 Codes Comments Essential hypertension    -  Primary ICD-10-CM: I10 
ICD-9-CM: 401.9 Vitals BP Pulse Height(growth percentile) Weight(growth percentile) SpO2 BMI  
 (!) 130/100 (BP 1 Location: Left arm, BP Patient Position: Sitting) 95 5' 9\" (1.753 m) 291 lb 9.6 oz (132.3 kg) 97% 43.06 kg/m2 Smoking Status Never Smoker Vitals History BMI and BSA Data Body Mass Index Body Surface Area 43.06 kg/m 2 2.54 m 2 Your Updated Medication List  
  
   
This list is accurate as of 3/22/18 11:36 AM.  Always use your most recent med list.  
  
  
  
  
 allopurinol 300 mg tablet Commonly known as:  Ozella Furrow Take 300 mg by mouth. ALPRAZolam 0.5 mg tablet Commonly known as:  Preet Dirk Take 0.5 mg by mouth. carvedilol 12.5 mg tablet Commonly known as:  Lorel Docker Take 12.5 mg by mouth. Dexlansoprazole 60 mg Cpdb Take 60 mg by mouth. FLONASE 50 mcg/actuation nasal spray Generic drug:  fluticasone 2 Sprays by Both Nostrils route daily. hydroCHLOROthiazide 25 mg tablet Commonly known as:  HYDRODIURIL Take 25 mg by mouth. KlonoPIN 0.5 mg tablet Generic drug:  clonazePAM  
Take  by mouth nightly as needed. lisinopril 40 mg tablet Commonly known as:  Marlane Fausto Take 40 mg by mouth. MUCINEX D  mg per tablet Generic drug:  PSEUDOEPHEDRINE-guaiFENesin Take 1 Tab by mouth every twelve (12) hours. NIFEdipine ER 90 mg ER tablet Commonly known as:  ADALAT CC Take 90 mg by mouth. raNITIdine 150 mg tablet Commonly known as:  ZANTAC Take 150 mg by mouth nightly. sertraline 100 mg tablet Commonly known as:  ZOLOFT Take 100 mg by mouth. simvastatin 10 mg tablet Commonly known as:  ZOCOR Take 10 mg by mouth. traZODone 100 mg tablet Commonly known as:  Daniel Bristle Take 100 mg by mouth. We Performed the Following AMB POC EKG ROUTINE W/ 12 LEADS, INTER & REP [30602 CPT(R)] Introducing [facility]! Dear Katlin Ga: Thank you for requesting a [employer] account. Our records indicate that you already have an active [employer] account. You can access your account anytime at [email or web address] Did you know that you can access your hospital and ER discharge instructions at any time in [employer]? You can also review all of your test results from your hospital stay or ER visit. Additional Information If you have questions, please visit the Frequently Asked Questions section of the [employer] website at [email or web address] Remember, [employer] is NOT to be used for urgent needs. For medical emergencies, dial 911. Now available from your iPhone and Android! Please provide this summary of care documentation to your next provider. Your primary care clinician is listed as Phys Other. If you have any questions after today's visit, please call [phone].

## 2018-03-22 NOTE — PROGRESS NOTES
[place] [address]: [facility]  [phone]    History of Present Illness:   Mr. Ashu Otto is a 57 yo M with essential hypertension, mixed hyperlipidemia, obesity, referred by Dr. Yaakov Cueto for cardiac evaluation. He is here for preop evaluation prior to weight loss surgery with laparoscopic bypass. From a cardiac standpoint, he denies any prior cardiac issues. From a symptom standpoint, he denies any chest pain, shortness of breath, palpitations, lightheadedness, dizziness or syncope. He does go to the gym three times a week without any restriction. EKG was normal sinus rhythm, heart rate of 95 and nonspecific ST-T wave abnormality. He is compensated on exam with clear lungs. Blood pressure was 130/100 with a heart rate of 95. Soc hx. No tobacco  Fam hx. No early CAD  Assessment and Plan:   1. Preop cardiac evaluation. He is stable with no active cardiac issues. He is low risk for cardiac complications and no additional cardiac evaluation is indicated at this time. 2. Essential hypertension. Blood pressure is mildly elevated here, but overall controlled and no changes made. 3. Mixed hyperlipidemia. 4. Obesity. Bariatric comorbidities present are        Past Medical History:   Diagnosis Date    Anxiety      Fine esophagus       last EGD 2015     Dyspepsia and other specified disorders of function of stomach      Hypertension      CHONG (obstructive sleep apnea        Assessment/Plan:    He  has a past medical history of Anxiety; Fine esophagus; Dyspepsia and other specified disorders of function of stomach; Hypertension; and CHONG (obstructive sleep apnea). He also has no past medical history of CAD (coronary artery disease). All other systems negative except as above. PE  Vitals:    03/22/18 0935   BP: (!) 130/100   Pulse: 95   SpO2: 97%   Weight: 291 lb 9.6 oz (132.3 kg)   Height: 5' 9\" (1.753 m)    Body mass index is 43.06 kg/(m^2).    General appearance - alert, obese, and in no distress  Mental status - affect appropriate to mood  Eyes - sclera anicteric, moist mucous membranes  Neck - supple, no JVD  Chest - clear to auscultation, no wheezes, rales or rhonchi  Heart - normal rate, regular rhythm, normal S1, S2, no murmurs, rubs, clicks or gallops  Abdomen - soft, nontender, nondistended, no masses or organomegaly  Neurological - no focal deficit  Extremities - peripheral pulses normal, no pedal edema      Recent Labs:  No results found for: CHOL, CHOLX, CHLST, CHOLV, 312659, HDL, LDL, LDLC, DLDLP, TGLX, TRIGL, TRIGP, CHHD, CHHDX  No results found for: JAC, CREAPOC, [facility], CREA, REFC3, REFC4  No results found for: BUN, BUNPOC, IBUN, MBUNV, BUNV  No results found for: K, KI, PLK, WBK  No results found for: HBA1C, HGBE8, EYF3MJTE, KMW7DDOG  No results found for: HGBPOC, HGB, HGBP, HGBEXT, HGBEXT  No results found for: PLT, PLTEXT, PLTEXT    Reviewed:  Past Medical History:   Diagnosis Date    Anxiety     Fine esophagus     last EGD 2015     Dyspepsia and other specified disorders of function of stomach     Hypertension     CHONG (obstructive sleep apnea)     on BiPap     History   Smoking Status    Never Smoker   Smokeless Tobacco    Never Used     History   Alcohol Use    Yes     Comment: 1 x month or less      No Known Allergies    Current Outpatient Prescriptions   Medication Sig    clonazePAM (KLONOPIN) 0.5 mg tablet Take  by mouth nightly as needed.  allopurinol (ZYLOPRIM) 300 mg tablet Take 300 mg by mouth.  lisinopril (PRINIVIL, ZESTRIL) 40 mg tablet Take 40 mg by mouth.  carvedilol (COREG) 12.5 mg tablet Take 12.5 mg by mouth.  NIFEdipine ER (ADALAT CC) 90 mg ER tablet Take 90 mg by mouth.  hydroCHLOROthiazide (HYDRODIURIL) 25 mg tablet Take 25 mg by mouth.  Dexlansoprazole 60 mg CpDB Take 60 mg by mouth.  ALPRAZolam (XANAX) 0.5 mg tablet Take 0.5 mg by mouth.  simvastatin (ZOCOR) 10 mg tablet Take 10 mg by mouth.     sertraline (ZOLOFT) 100 mg tablet Take 100 mg by mouth.  traZODone (DESYREL) 100 mg tablet Take 100 mg by mouth.  raNITIdine (ZANTAC) 150 mg tablet Take 150 mg by mouth nightly.  PSEUDOEPHEDRINE-guaiFENesin (MUCINEX D)  mg per tablet Take 1 Tab by mouth every twelve (12) hours.  fluticasone (FLONASE) 50 mcg/actuation nasal spray 2 Sprays by Both Nostrils route daily. No current facility-administered medications for this visit.         Eleanor Morley MD  [facility] [place]  [address] [facility] [address]  [facility], [address]

## 2018-05-01 DIAGNOSIS — K21.9 CHRONIC GERD: Primary | ICD-10-CM

## 2018-05-01 DIAGNOSIS — E66.01 MORBID OBESITY WITH BMI OF 40.0-44.9, ADULT (HCC): ICD-10-CM

## 2018-06-14 ENCOUNTER — HOSPITAL ENCOUNTER (OUTPATIENT)
Dept: PREADMISSION TESTING | Age: 48
Discharge: HOME | End: 2018-06-14
Attending: NURSE PRACTITIONER
Payer: COMMERCIAL

## 2018-06-14 ENCOUNTER — HOSPITAL ENCOUNTER (OUTPATIENT)
Dept: ULTRASOUND IMAGING | Age: 48
Discharge: HOME | End: 2018-06-14
Attending: NURSE PRACTITIONER
Payer: COMMERCIAL

## 2018-06-14 ENCOUNTER — HOSPITAL ENCOUNTER (OUTPATIENT)
Dept: GENERAL RADIOLOGY | Age: 48
Discharge: HOME | End: 2018-06-14
Attending: NURSE PRACTITIONER
Payer: COMMERCIAL

## 2018-06-14 VITALS
BODY MASS INDEX: 42.95 KG/M2 | TEMPERATURE: 97.8 F | HEIGHT: 69 IN | DIASTOLIC BLOOD PRESSURE: 93 MMHG | SYSTOLIC BLOOD PRESSURE: 150 MMHG | WEIGHT: 290 LBS | HEART RATE: 98 BPM

## 2018-06-14 DIAGNOSIS — E66.01 MORBID OBESITY WITH BMI OF 40.0-44.9, ADULT (HCC): ICD-10-CM

## 2018-06-14 DIAGNOSIS — K21.9 CHRONIC GERD: ICD-10-CM

## 2018-06-14 LAB
25(OH)D3 SERPL-MCNC: 22.6 NG/ML (ref 30–100)
ALBUMIN SERPL-MCNC: 4.1 G/DL (ref 3.5–5)
ALBUMIN/GLOB SERPL: 1.1 {RATIO} (ref 1.1–2.2)
ALP SERPL-CCNC: 66 U/L (ref 45–117)
ALT SERPL-CCNC: 62 U/L (ref 12–78)
ANION GAP SERPL CALC-SCNC: 5 MMOL/L (ref 5–15)
APPEARANCE UR: CLEAR
ARTERIAL PATENCY WRIST A: YES
AST SERPL-CCNC: 35 U/L (ref 15–37)
ATRIAL RATE: 87 BPM
BACTERIA URNS QL MICRO: NEGATIVE /HPF
BASE EXCESS BLD CALC-SCNC: 6 MMOL/L
BASOPHILS # BLD: 0 K/UL (ref 0–0.1)
BASOPHILS NFR BLD: 0 % (ref 0–1)
BDY SITE: ABNORMAL
BILIRUB SERPL-MCNC: 0.4 MG/DL (ref 0.2–1)
BILIRUB UR QL: NEGATIVE
BUN SERPL-MCNC: 17 MG/DL (ref 6–20)
BUN/CREAT SERPL: 16 (ref 12–20)
CALCIUM SERPL-MCNC: 8.4 MG/DL (ref 8.5–10.1)
CALCULATED P AXIS, ECG09: 44 DEGREES
CALCULATED R AXIS, ECG10: 26 DEGREES
CALCULATED T AXIS, ECG11: -40 DEGREES
CHLORIDE SERPL-SCNC: 104 MMOL/L (ref 97–108)
CHOLEST SERPL-MCNC: 184 MG/DL
CO2 SERPL-SCNC: 34 MMOL/L (ref 21–32)
COLOR UR: NORMAL
CREAT SERPL-MCNC: 1.09 MG/DL (ref 0.7–1.3)
CRP SERPL-MCNC: 0.81 MG/DL (ref 0–0.6)
DIAGNOSIS, 93000: NORMAL
DIFFERENTIAL METHOD BLD: ABNORMAL
EOSINOPHIL # BLD: 0.2 K/UL (ref 0–0.4)
EOSINOPHIL NFR BLD: 3 % (ref 0–7)
EPITH CASTS URNS QL MICRO: NORMAL /LPF
ERYTHROCYTE [DISTWIDTH] IN BLOOD BY AUTOMATED COUNT: 14 % (ref 11.5–14.5)
GAS FLOW.O2 O2 DELIVERY SYS: ABNORMAL L/MIN
GLOBULIN SER CALC-MCNC: 3.8 G/DL (ref 2–4)
GLUCOSE SERPL-MCNC: 98 MG/DL (ref 65–100)
GLUCOSE UR STRIP.AUTO-MCNC: NEGATIVE MG/DL
HCO3 BLD-SCNC: 30.1 MMOL/L (ref 22–26)
HCT VFR BLD AUTO: 41 % (ref 36.6–50.3)
HGB BLD-MCNC: 13.6 G/DL (ref 12.1–17)
HGB UR QL STRIP: NEGATIVE
HYALINE CASTS URNS QL MICRO: NORMAL /LPF (ref 0–5)
IMM GRANULOCYTES # BLD: 0 K/UL (ref 0–0.04)
IMM GRANULOCYTES NFR BLD AUTO: 0 % (ref 0–0.5)
KETONES UR QL STRIP.AUTO: NEGATIVE MG/DL
LEUKOCYTE ESTERASE UR QL STRIP.AUTO: NEGATIVE
LYMPHOCYTES # BLD: 3.1 K/UL (ref 0.8–3.5)
LYMPHOCYTES NFR BLD: 50 % (ref 12–49)
MAGNESIUM SERPL-MCNC: 2.4 MG/DL (ref 1.6–2.4)
MCH RBC QN AUTO: 29.8 PG (ref 26–34)
MCHC RBC AUTO-ENTMCNC: 33.2 G/DL (ref 30–36.5)
MCV RBC AUTO: 89.9 FL (ref 80–99)
MONOCYTES # BLD: 0.4 K/UL (ref 0–1)
MONOCYTES NFR BLD: 6 % (ref 5–13)
NEUTS SEG # BLD: 2.6 K/UL (ref 1.8–8)
NEUTS SEG NFR BLD: 41 % (ref 32–75)
NITRITE UR QL STRIP.AUTO: NEGATIVE
NRBC # BLD: 0 K/UL (ref 0–0.01)
NRBC BLD-RTO: 0 PER 100 WBC
P-R INTERVAL, ECG05: 152 MS
PCO2 BLD: 42.7 MMHG (ref 35–45)
PH BLD: 7.46 [PH] (ref 7.35–7.45)
PH UR STRIP: 7 [PH] (ref 5–8)
PLATELET # BLD AUTO: 276 K/UL (ref 150–400)
PMV BLD AUTO: 11.7 FL (ref 8.9–12.9)
PO2 BLD: 72 MMHG (ref 80–100)
POTASSIUM SERPL-SCNC: 3.2 MMOL/L (ref 3.5–5.1)
PROT SERPL-MCNC: 7.9 G/DL (ref 6.4–8.2)
PROT UR STRIP-MCNC: NEGATIVE MG/DL
Q-T INTERVAL, ECG07: 392 MS
QRS DURATION, ECG06: 90 MS
QTC CALCULATION (BEZET), ECG08: 471 MS
RBC # BLD AUTO: 4.56 M/UL (ref 4.1–5.7)
RBC #/AREA URNS HPF: NORMAL /HPF (ref 0–5)
SAO2 % BLD: 95 % (ref 92–97)
SODIUM SERPL-SCNC: 143 MMOL/L (ref 136–145)
SP GR UR REFRACTOMETRY: 1.02 (ref 1–1.03)
SPECIMEN TYPE: ABNORMAL
T4 FREE SERPL-MCNC: 0.9 NG/DL (ref 0.8–1.5)
TSH SERPL DL<=0.05 MIU/L-ACNC: 0.56 UIU/ML (ref 0.36–3.74)
UA: UC IF INDICATED,UAUC: NORMAL
UROBILINOGEN UR QL STRIP.AUTO: 0.2 EU/DL (ref 0.2–1)
VENTRICULAR RATE, ECG03: 87 BPM
WBC # BLD AUTO: 6.2 K/UL (ref 4.1–11.1)
WBC URNS QL MICRO: NORMAL /HPF (ref 0–4)

## 2018-06-14 PROCEDURE — 84439 ASSAY OF FREE THYROXINE: CPT | Performed by: SURGERY

## 2018-06-14 PROCEDURE — 80053 COMPREHEN METABOLIC PANEL: CPT | Performed by: SURGERY

## 2018-06-14 PROCEDURE — 82465 ASSAY BLD/SERUM CHOLESTEROL: CPT | Performed by: SURGERY

## 2018-06-14 PROCEDURE — 71046 X-RAY EXAM CHEST 2 VIEWS: CPT

## 2018-06-14 PROCEDURE — 36600 WITHDRAWAL OF ARTERIAL BLOOD: CPT

## 2018-06-14 PROCEDURE — 85025 COMPLETE CBC W/AUTO DIFF WBC: CPT | Performed by: SURGERY

## 2018-06-14 PROCEDURE — 93005 ELECTROCARDIOGRAM TRACING: CPT

## 2018-06-14 PROCEDURE — 76705 ECHO EXAM OF ABDOMEN: CPT

## 2018-06-14 PROCEDURE — 81001 URINALYSIS AUTO W/SCOPE: CPT | Performed by: SURGERY

## 2018-06-14 PROCEDURE — 82306 VITAMIN D 25 HYDROXY: CPT | Performed by: SURGERY

## 2018-06-14 PROCEDURE — 86140 C-REACTIVE PROTEIN: CPT | Performed by: SURGERY

## 2018-06-14 PROCEDURE — 84443 ASSAY THYROID STIM HORMONE: CPT | Performed by: SURGERY

## 2018-06-14 PROCEDURE — 83735 ASSAY OF MAGNESIUM: CPT | Performed by: SURGERY

## 2018-06-14 PROCEDURE — 82803 BLOOD GASES ANY COMBINATION: CPT

## 2018-06-14 RX ORDER — LABETALOL 100 MG/1
300 TABLET, FILM COATED ORAL 2 TIMES DAILY
COMMUNITY

## 2018-07-03 ENCOUNTER — OFFICE VISIT (OUTPATIENT)
Dept: SURGERY | Age: 48
End: 2018-07-03

## 2018-07-03 VITALS
BODY MASS INDEX: 43.55 KG/M2 | HEIGHT: 69 IN | OXYGEN SATURATION: 94 % | HEART RATE: 88 BPM | DIASTOLIC BLOOD PRESSURE: 90 MMHG | WEIGHT: 294 LBS | TEMPERATURE: 98.2 F | RESPIRATION RATE: 16 BRPM | SYSTOLIC BLOOD PRESSURE: 142 MMHG

## 2018-07-03 VITALS
DIASTOLIC BLOOD PRESSURE: 90 MMHG | TEMPERATURE: 98.2 F | HEIGHT: 69 IN | RESPIRATION RATE: 16 BRPM | SYSTOLIC BLOOD PRESSURE: 142 MMHG | BODY MASS INDEX: 43.55 KG/M2 | HEART RATE: 88 BPM | OXYGEN SATURATION: 94 % | WEIGHT: 294 LBS

## 2018-07-03 DIAGNOSIS — K22.70 BARRETT'S ESOPHAGUS WITHOUT DYSPLASIA: ICD-10-CM

## 2018-07-03 DIAGNOSIS — E66.01 MORBID OBESITY (HCC): Primary | ICD-10-CM

## 2018-07-03 DIAGNOSIS — G47.33 OSA (OBSTRUCTIVE SLEEP APNEA): ICD-10-CM

## 2018-07-03 DIAGNOSIS — E78.00 HYPERCHOLESTEREMIA: ICD-10-CM

## 2018-07-03 DIAGNOSIS — K21.9 CHRONIC GERD: ICD-10-CM

## 2018-07-03 DIAGNOSIS — Z98.890 STATUS POST NISSEN FUNDOPLICATION: ICD-10-CM

## 2018-07-03 DIAGNOSIS — I10 ESSENTIAL HYPERTENSION: ICD-10-CM

## 2018-07-03 DIAGNOSIS — K44.9 HIATAL HERNIA: ICD-10-CM

## 2018-07-03 DIAGNOSIS — E66.01 MORBID OBESITY WITH BMI OF 40.0-44.9, ADULT (HCC): Primary | ICD-10-CM

## 2018-07-03 RX ORDER — ONDANSETRON 4 MG/1
4 TABLET, ORALLY DISINTEGRATING ORAL
Qty: 30 TAB | Refills: 0 | Status: SHIPPED | OUTPATIENT
Start: 2018-07-03 | End: 2020-07-21 | Stop reason: SDUPTHER

## 2018-07-03 RX ORDER — ERGOCALCIFEROL 1.25 MG/1
50000 CAPSULE ORAL
Qty: 12 CAP | Refills: 0 | Status: SHIPPED | OUTPATIENT
Start: 2018-07-03

## 2018-07-03 NOTE — PATIENT INSTRUCTIONS
Body Mass Index: Care Instructions  Your Care Instructions    Body mass index (BMI) can help you see if your weight is raising your risk for health problems. It uses a formula to compare how much you weigh with how tall you are. · A BMI lower than 18.5 is considered underweight. · A BMI between 18.5 and 24.9 is considered healthy. · A BMI between 25 and 29.9 is considered overweight. A BMI of 30 or higher is considered obese. If your BMI is in the normal range, it means that you have a lower risk for weight-related health problems. If your BMI is in the overweight or obese range, you may be at increased risk for weight-related health problems, such as high blood pressure, heart disease, stroke, arthritis or joint pain, and diabetes. If your BMI is in the underweight range, you may be at increased risk for health problems such as fatigue, lower protection (immunity) against illness, muscle loss, bone loss, hair loss, and hormone problems. BMI is just one measure of your risk for weight-related health problems. You may be at higher risk for health problems if you are not active, you eat an unhealthy diet, or you drink too much alcohol or use tobacco products. Follow-up care is a key part of your treatment and safety. Be sure to make and go to all appointments, and call your doctor if you are having problems. It's also a good idea to know your test results and keep a list of the medicines you take. How can you care for yourself at home? · Practice healthy eating habits. This includes eating plenty of fruits, vegetables, whole grains, lean protein, and low-fat dairy. · If your doctor recommends it, get more exercise. Walking is a good choice. Bit by bit, increase the amount you walk every day. Try for at least 30 minutes on most days of the week. · Do not smoke. Smoking can increase your risk for health problems. If you need help quitting, talk to your doctor about stop-smoking programs and medicines. These can increase your chances of quitting for good. · Limit alcohol to 2 drinks a day for men and 1 drink a day for women. Too much alcohol can cause health problems. If you have a BMI higher than 25  · Your doctor may do other tests to check your risk for weight-related health problems. This may include measuring the distance around your waist. A waist measurement of more than 40 inches in men or 35 inches in women can increase the risk of weight-related health problems. · Talk with your doctor about steps you can take to stay healthy or improve your health. You may need to make lifestyle changes to lose weight and stay healthy, such as changing your diet and getting regular exercise. If you have a BMI lower than 18.5  · Your doctor may do other tests to check your risk for health problems. · Talk with your doctor about steps you can take to stay healthy or improve your health. You may need to make lifestyle changes to gain or maintain weight and stay healthy, such as getting more healthy foods in your diet and doing exercises to build muscle. Where can you learn more? Go to http://mervin-ashish.info/. Enter S176 in the search box to learn more about \"Body Mass Index: Care Instructions. \"  Current as of: October 13, 2016  Content Version: 11.4  © 4903-9047 [employer]wise, Incorporated. Care instructions adapted under license by [employer] (which disclaims liability or warranty for this information). If you have questions about a medical condition or this instruction, always ask your healthcare professional. [address] any warranty or liability for your use of this information.

## 2018-07-03 NOTE — PROGRESS NOTES
HISTORY OF PRESENT ILLNESS  History and Physical.     The patient is a 56 y.o. obese male here for history and physical for Lap Nissen take down, lap gastric byass and hiatal hernia repair with Dr. Amarjit Vila 7/16/18  surgery. Body mass index is 43.42 kg/(m^2). Visit Vitals    /90 (BP 1 Location: Left arm, BP Patient Position: Sitting)    Pulse 88    Temp 98.2 °F (36.8 °C) (Oral)    Resp 16    Ht 5' 9\" (1.753 m)    Wt 294 lb (133.4 kg)    SpO2 94%    BMI 43.42 kg/m2         Patient has an advanced directive: NO        Mr. Abdelrahman Bee has a reminder for a \"due or due soon\" health maintenance. I have asked that he contact his primary care provider for follow-up on this health maintenance.       Patient Active Problem List    Diagnosis Date Noted    Essential hypertension 02/20/2018    CHONG (obstructive sleep apnea) 02/20/2018    Fine's esophagus without dysplasia 02/20/2018    Hiatal hernia, recurrent 02/20/2018    Status post Nissen fundoplication 57/66/6143    Morbid obesity with BMI of 40.0-44.9, adult ([address] 09/01/2017    Chronic GERD 09/01/2017     Past Medical History:   Diagnosis Date    Anxiety     Fine esophagus     last EGD 2015     Dyspepsia and other specified disorders of function of stomach     GERD (gastroesophageal reflux disease)     Hypertension     Ill-defined condition     DEPRESSION     Morbid obesity ([address]     CHONG (obstructive sleep apnea)     on BiPap    Paraesophageal hernia       Past Surgical History:   Procedure Laterality Date    HX GI      Kyle fundoplication  Memory Number in [place]    HX HERNIA REPAIR  2009    HIATAL C Guy Cousins    HX ORTHOPAEDIC  2004    left ankle reconstruction    HX ORTHOPAEDIC  2005    RIGHT PINKIE    HX RHINOPLASTY      HX TONSILLECTOMY        Social History   Substance Use Topics    Smoking status: Never Smoker    Smokeless tobacco: Never Used    Alcohol use Yes      Comment: 1 x month or less       Family History Problem Relation Age of Onset    Cancer Mother      SKIN    Diabetes Mother     Hypertension Mother     Hypertension Father     Parkinson's Disease Father     Lupus Sister     Sickle Cell Anemia Brother       Prior to Admission medications    Medication Sig Start Date End Date Taking? Authorizing Provider   AMLODIPINE BESYLATE, BULK, 100 mg by Does Not Apply route two (2) times a day. Yes Historical Provider   labetalol (NORMODYNE) 100 mg tablet Take 300 mg by mouth two (2) times a day. Yes Historical Provider   allopurinol (ZYLOPRIM) 300 mg tablet Take 300 mg by mouth. Yes Historical Provider   lisinopril (PRINIVIL, ZESTRIL) 40 mg tablet Take 40 mg by mouth. Yes Historical Provider   hydroCHLOROthiazide (HYDRODIURIL) 25 mg tablet Take 25 mg by mouth. Yes Historical Provider   Dexlansoprazole 60 mg CpDB Take 60 mg by mouth. Yes Historical Provider   simvastatin (ZOCOR) 10 mg tablet Take 10 mg by mouth. Yes Historical Provider   sertraline (ZOLOFT) 100 mg tablet Take 100 mg by mouth. Yes Historical Provider   traZODone (DESYREL) 100 mg tablet Take 100 mg by mouth. Yes Historical Provider     No Known Allergies    HPI    Review of Systems   Constitutional: Negative for chills, fever and malaise/fatigue. HENT: Positive for congestion. Negative for hearing loss, sinus pain, sore throat and tinnitus. Eyes: Negative for blurred vision, double vision, pain, discharge and redness. Wears glasses   Respiratory: Negative for cough, sputum production, shortness of breath and wheezing. Cardiovascular: Negative for chest pain, palpitations and leg swelling. Gastrointestinal: Positive for heartburn. Negative for abdominal pain, constipation, diarrhea, nausea and vomiting. Genitourinary: Negative for dysuria, frequency and urgency. Musculoskeletal: Positive for joint pain and neck pain. Negative for back pain. Skin: Negative for itching and rash.    Neurological: Negative for dizziness, seizures and headaches. Psychiatric/Behavioral: Negative for depression (on zoloft). Physical Exam   Constitutional: He is oriented to person, place, and time. He appears well-developed and well-nourished. HENT:   Head: Normocephalic. Neck: Normal range of motion. Neck supple. Cardiovascular: Normal rate, regular rhythm and normal heart sounds. Pulmonary/Chest: Effort normal and breath sounds normal.   Abdominal: Soft. Bowel sounds are normal. He exhibits no distension. There is no tenderness. No masses or hernias noted. Musculoskeletal: Normal range of motion. Neurological: He is alert and oriented to person, place, and time. Skin: Skin is warm and dry. Psychiatric: He has a normal mood and affect. ASSESSMENT and PLAN    Assessment:     Morbid obesity with body mass index of 43. Co-morbids listed above    Plan:   Patient is schedule for Lap Nissen take down, lap gastric byass and hiatal hernia repair with Dr. Jesica Vargas 7/16/18 with Dr. Jesica Vargas on 7/16/2018 at [address] patient in regard to post diet restrictions, follow- up office visits, follow- up care. Patient as received educational booklet and vitamin list. E-prescribed: zofran and Ergocalciferol. Reviewed liver shrinking diet. Pt verbalized understanding and questions were answered to the best of my knowledge and ability.           Signed By: Naima Villegas NP     July 3, 2018

## 2018-07-03 NOTE — PROGRESS NOTES
1. Have you been to the ER, urgent care clinic since your last visit? Hospitalized since your last visit? No    2. Have you seen or consulted any other health care providers outside of the [address] [place] [address] since your last visit? Include any pap smears or colon screening.  No

## 2018-07-03 NOTE — MR AVS SNAPSHOT
[address] [facility] [address] [facility] [address] [id number] [phone] Patient: Gentry Moscoso MRN: [id number] Visit Information Date & Time Provider Department Dept. Phone Encounter #  
 7/3/2018  1:20 PM Cheryl Kahn NP [address] [phone] [id number] Your Appointments 7/3/2018  2:20 PM  
ESTABLISHED PATIENT with Mateo Johnson MD  
[address] ([address]) Appt Note: sign consents for lap RY/HHR/t-down Nissen 7/16/18; see Tor Holm first; for 7/03--see Laura jensen in [address] [place] [address] [address] [facility] [address] [facility] [address] [facility] [address]  
[address] [place] [address] Upcoming Health Maintenance Date Due DTaP/Tdap/Td series (1 - Tdap) 9/14/1992 Influenza Age 5 to Adult 8/1/2018 Allergies as of 7/3/2018  Review Complete On: 7/3/2018 By: Cheryl Kahn NP No Known Allergies Current Immunizations  Never Reviewed No immunizations on file. Not reviewed this visit You Were Diagnosed With   
  
 Codes Comments Morbid obesity ([facility] [address]    -  Primary ICD-10-CM: E66.01 
ICD-9-CM: 278.01   
 BMI 40.0-44.9, adult ([facility] [address]     ICD-10-CM: Z68.41 
ICD-9-CM: V85.41 Essential hypertension     ICD-10-CM: I10 
ICD-9-CM: 401.9 CHONG (obstructive sleep apnea)     ICD-10-CM: G47.33 
ICD-9-CM: 327.23 Fine's esophagus without dysplasia     ICD-10-CM: K22.70 ICD-9-CM: 530.85 Vitals BP Pulse Temp Resp Height(growth percentile) Weight(growth percentile) 142/90 (BP 1 Location: Left arm, BP Patient Position: Sitting) 88 98.2 °F (36.8 °C) (Oral) 16 5' 9\" (1.753 m) 294 lb (133.4 kg) SpO2 BMI Smoking Status 94% 43.42 kg/m2 Never Smoker Vitals History BMI and BSA Data Body Mass Index Body Surface Area  
 43.42 kg/m 2 2.55 m 2 Preferred Pharmacy Pharmacy Name Phone [facility] #[id number] [place] [address] [facility] Your Updated Medication List  
  
   
This list is accurate as of 7/3/18  1:57 PM.  Always use your most recent med list.  
  
  
  
  
 allopurinol 300 mg tablet Commonly known as:  Padmini Vu Take 300 mg by mouth. AMLODIPINE BESYLATE (BULK) 100 mg by Does Not Apply route two (2) times a day. Dexlansoprazole 60 mg Cpdb Take 60 mg by mouth.  
  
 ergocalciferol 50,000 unit capsule Commonly known as:  ERGOCALCIFEROL Take 1 Cap by mouth every seven (7) days. hydroCHLOROthiazide 25 mg tablet Commonly known as:  HYDRODIURIL Take 25 mg by mouth. labetalol 100 mg tablet Commonly known as:  Bradly Ang Take 300 mg by mouth two (2) times a day. lisinopril 40 mg tablet Commonly known as:  Juana Tellez Take 40 mg by mouth. ondansetron 4 mg disintegrating tablet Commonly known as:  ZOFRAN ODT Take 1 Tab by mouth every eight (8) hours as needed for Nausea. sertraline 100 mg tablet Commonly known as:  ZOLOFT Take 100 mg by mouth. simvastatin 10 mg tablet Commonly known as:  ZOCOR Take 10 mg by mouth. traZODone 100 mg tablet Commonly known as:  Philippe Teri Take 100 mg by mouth. Prescriptions Sent to Pharmacy Refills  
 ergocalciferol (ERGOCALCIFEROL) 50,000 unit capsule 0 Sig: Take 1 Cap by mouth every seven (7) days. Class: Normal  
 Pharmacy: [address] [place] [address] #: [phone] Route: Oral  
 ondansetron (ZOFRAN ODT) 4 mg disintegrating tablet 0 Sig: Take 1 Tab by mouth every eight (8) hours as needed for Nausea. Class: Normal  
 Pharmacy: [address] [place] [address] #: [phone] Route: Oral  
  
Patient Instructions Body Mass Index: Care Instructions Your Care Instructions Body mass index (BMI) can help you see if your weight is raising your risk for health problems. It uses a formula to compare how much you weigh with how tall you are. · A BMI lower than 18.5 is considered underweight. · A BMI between 18.5 and 24.9 is considered healthy. · A BMI between 25 and 29.9 is considered overweight. A BMI of 30 or higher is considered obese. If your BMI is in the normal range, it means that you have a lower risk for weight-related health problems. If your BMI is in the overweight or obese range, you may be at increased risk for weight-related health problems, such as high blood pressure, heart disease, stroke, arthritis or joint pain, and diabetes. If your BMI is in the underweight range, you may be at increased risk for health problems such as fatigue, lower protection (immunity) against illness, muscle loss, bone loss, hair loss, and hormone problems. BMI is just one measure of your risk for weight-related health problems. You may be at higher risk for health problems if you are not active, you eat an unhealthy diet, or you drink too much alcohol or use tobacco products. Follow-up care is a key part of your treatment and safety. Be sure to make and go to all appointments, and call your doctor if you are having problems. It's also a good idea to know your test results and keep a list of the medicines you take. How can you care for yourself at home? · Practice healthy eating habits. This includes eating plenty of fruits, vegetables, whole grains, lean protein, and low-fat dairy. · If your doctor recommends it, get more exercise. Walking is a good choice. Bit by bit, increase the amount you walk every day. Try for at least 30 minutes on most days of the week. · Do not smoke. Smoking can increase your risk for health problems. If you need help quitting, talk to your doctor about stop-smoking programs and medicines. These can increase your chances of quitting for good. · Limit alcohol to 2 drinks a day for men and 1 drink a day for women. Too much alcohol can cause health problems. If you have a BMI higher than 25 · Your doctor may do other tests to check your risk for weight-related health problems. This may include measuring the distance around your waist. A waist measurement of more than 40 inches in men or 35 inches in women can increase the risk of weight-related health problems. · Talk with your doctor about steps you can take to stay healthy or improve your health. You may need to make lifestyle changes to lose weight and stay healthy, such as changing your diet and getting regular exercise. If you have a BMI lower than 18.5 · Your doctor may do other tests to check your risk for health problems. · Talk with your doctor about steps you can take to stay healthy or improve your health. You may need to make lifestyle changes to gain or maintain weight and stay healthy, such as getting more healthy foods in your diet and doing exercises to build muscle. Where can you learn more? Go to http://mervin-ashish.info/. Enter S176 in the search box to learn more about \"Body Mass Index: Care Instructions. \" Current as of: October 13, 2016 Content Version: 11.4 © 8144-2380 [employer]. Care instructions adapted under license by [employer] (which disclaims liability or warranty for this information). If you have questions about a medical condition or this instruction, always ask your healthcare professional. [address] any warranty or liability for your use of this information. Introducing \[facility]\"" & HEALTH SERVICES! Dear Randolph Burch: Thank you for requesting a [employer] account. Our records indicate that you already have an active [employer] account. You can access your account anytime at [email or web address]. [employer] Did you know that you can access your hospital and ER discharge instructions at any time in [employer]? You can also review all of your test results from your hospital stay or ER visit. Additional Information If you have questions, please visit the Frequently Asked Questions section of the [employer] website at [email or web address] Remember, [employer] is NOT to be used for urgent needs. For medical emergencies, dial 911. Now available from your iPhone and Android! Please provide this summary of care documentation to your next provider. Your primary care clinician is listed as Phys Other. If you have any questions after today's visit, please call [phone].

## 2018-07-04 PROBLEM — E78.00 HYPERCHOLESTEREMIA: Status: ACTIVE | Noted: 2018-07-04

## 2018-07-04 NOTE — PROGRESS NOTES
Subjective: The patient is a 56 y.o. obese male scheduled for fundoplication takedown with gastric bypass on 7/16. Body mass index is 43.42 kg/(m^2). Gentry Moscoso has tried multiple diets in his  lifetime most recently trying unsupervised diets during which he was able to lose small amounts of weight.     Bariatric comorbidities present are   Past Medical History:   Diagnosis Date    Anxiety     Fine esophagus     last EGD 2015     Dyspepsia and other specified disorders of function of stomach     GERD (gastroesophageal reflux disease)     Hypertension     Ill-defined condition     DEPRESSION     Morbid obesity ([address]     CHONG (obstructive sleep apnea)     on BiPap    Paraesophageal hernia        Patient Active Problem List    Diagnosis Date Noted    Hypercholesteremia 07/04/2018    Essential hypertension 02/20/2018    CHONG (obstructive sleep apnea) 02/20/2018    Fine's esophagus without dysplasia 02/20/2018    Hiatal hernia, recurrent 02/20/2018    Status post Nissen fundoplication 11/17/6006    Morbid obesity with BMI of 40.0-44.9, adult ([address] 09/01/2017    Chronic GERD 09/01/2017     Past Medical History:   Diagnosis Date    Anxiety     Fine esophagus     last EGD 2015     Dyspepsia and other specified disorders of function of stomach     GERD (gastroesophageal reflux disease)     Hypertension     Ill-defined condition     DEPRESSION     Morbid obesity ([address]     CHONG (obstructive sleep apnea)     on BiPap    Paraesophageal hernia       Past Surgical History:   Procedure Laterality Date    HX GI      K[place] fundoplication Dr. Rohan Hdez in [place]    HX HERNIA REPAIR  2009    HIATAL C Julane Glee    HX ORTHOPAEDIC  2004    left ankle reconstruction    HX ORTHOPAEDIC  2005    RIGHT PINKIE    HX RHINOPLASTY      HX TONSILLECTOMY        Social History   Substance Use Topics    Smoking status: Never Smoker    Smokeless tobacco: Never Used    Alcohol use Yes Comment: 1 x month or less       Family History   Problem Relation Age of Onset    Cancer Mother      SKIN    Diabetes Mother     Hypertension Mother     Hypertension Father     Parkinson's Disease Father     Lupus Sister     Sickle Cell Anemia Brother       Prior to Admission medications    Medication Sig Start Date End Date Taking? Authorizing Provider   AMLODIPINE BESYLATE, BULK, 100 mg by Does Not Apply route two (2) times a day. Yes Historical Provider   labetalol (NORMODYNE) 100 mg tablet Take 300 mg by mouth two (2) times a day. Yes Historical Provider   allopurinol (ZYLOPRIM) 300 mg tablet Take 300 mg by mouth. Yes Historical Provider   lisinopril (PRINIVIL, ZESTRIL) 40 mg tablet Take 40 mg by mouth. Yes Historical Provider   hydroCHLOROthiazide (HYDRODIURIL) 25 mg tablet Take 25 mg by mouth. Yes Historical Provider   Dexlansoprazole 60 mg CpDB Take 60 mg by mouth. Yes Historical Provider   simvastatin (ZOCOR) 10 mg tablet Take 10 mg by mouth. Yes Historical Provider   sertraline (ZOLOFT) 100 mg tablet Take 100 mg by mouth. Yes Historical Provider   traZODone (DESYREL) 100 mg tablet Take 100 mg by mouth. Yes Historical Provider   ergocalciferol (ERGOCALCIFEROL) 50,000 unit capsule Take 1 Cap by mouth every seven (7) days. 7/3/18   Eugenio Day NP   ondansetron (ZOFRAN ODT) 4 mg disintegrating tablet Take 1 Tab by mouth every eight (8) hours as needed for Nausea. 7/3/18   Eugenio Day NP     No Known Allergies      Objective:     Visit Vitals    /90    Pulse 88    Temp 98.2 °F (36.8 °C)    Resp 16    Ht 5' 9\" (1.753 m)    Wt 294 lb (133.4 kg)    SpO2 94%    BMI 43.42 kg/m2       Assessment:     Morbid obesity with comorbidities in setting of failed Nissen fundoplication with recurrent hiatal hernia. No success with medical management. Plan:     Laparoscopic takedown of Nissen fundoplication with hiatal hernia repair with mesh, gastric bypass.     All of his questions have been answered and he has signed a consent for this operation following review of the detailed informed consent document. He was counseled on the 2 week preoperative liver shrinking diet. The postoperative dietary changes and discharge information has been given to him in the form of a booklet. We discussed that this procedure is complex and is equivalent to revisional bariatric surgery with 3-5 x higher risks than primary gastric bypass given that fundoplication takedown, mediastinal dissection, hiatal hernia repair will be required. Reviewed risks to include but not limited to bleeding, infection, VTE, leak, conversion to open procedure, ulcer, esophageal injury; we also discussed that if esophageal injury occurred, repair and cessation of the procedure with plans for staged gastric bypass may be necessary-he acknowledges understanding and desires to proceed. All questions answered. 28 minutes spent with patient (greater than 50% of time in face-face consultation reviewing potential risks, anticipated hospital course, post-op diet, activity restrictions).       Signed By: Lisseth Torrez MD     July 4, 2018

## 2018-07-04 NOTE — PATIENT INSTRUCTIONS
Learning About How to Prepare for Weight-Loss Surgery  How can you prepare for weight-loss surgery? Having weight-loss surgery (also called bariatric surgery) is a big step. You can prepare for surgery by having a plan. Your plan may include your goals for losing weight and how to makes changes in your diet, activity, and lifestyle to help raise your chances of success. One way to prepare for surgery is to think about your goal or reason why you want to reach a healthy weight. Do you want to lower your blood pressure, cholesterol, or blood sugar? Do you want to be able to sleep better, play with your kids, or walk around the block? Having a reason can help you stay with your plan and meet your goals. Your weight-loss surgery team can help you meet your goals and get ready for surgery. Jeri Castro work with a team that's trained to help you lose weight and make healthy changes in your life. This team may include:  · A medical doctor or nurse to help manage your care and schedule tests before surgery. · A surgeon who specializes in weight-loss surgery. · A registered dietitian to help you plan meals and make changes in the way you eat. · An exercise specialist to help you be more active and get stronger. · A therapist or counselor to help you learn why you eat and teach you ways to deal with stress and your emotions. Your team will also be there to help you prepare for life after surgery. They will help you adjust to new ways of eating and changes to your body. How will weight-loss surgery affect your life? You have likely thought a lot about how surgery may affect your life-how you will eat, how your body will look, or how you will feel. Some people feel overwhelmed with these changes. But planning can help you prepare for the changes and meet your weight-loss goals. One important step in your plan is to learn about the ways surgery will affect your life. These may include:  · A slimmer you.  You probably will lose weight very quickly in the first few months after surgery. As time goes on, your weight loss will slow down. How much weight you lose depends on what type of surgery you had and how well your new eating and activity plans are working for you. · A new way of eating. Success in reaching and keeping a healthy weight depends on making lifelong changes in how you eat. After surgery, you raise your chances of success if you:  ¨ Eat just a few ounces of food at a time. ¨ Eat very slowly and chew your food to mush. ¨ Don't drink for 30 minutes before you eat, during your meal, and for 30 minutes after you eat. ¨ Are careful about drinking alcohol. ¨ Avoid foods that are high in fat or sugar. ¨ Take vitamin and mineral supplements. · A healthier you. Weight-loss surgery can have some real health benefits. Problems like diabetes, high blood pressure, and sleep apnea may go away-or at least become easier to manage. · A more active you. After surgery, being active on most days of the week will help you reach your weight goal and avoid gaining back the weight you lose. · A lot of extra skin. When you lose weight quickly, you may have a lot of extra skin. That's normal. You can have surgery to remove the extra skin if it bothers you. There are going to be some ups and downs while you get used to these changes. So another way to adjust is to identify who can help support you. Getting support from friends and family can help. And joining a support group for people who have had the surgery can be a big help too, because they know what you're going through. As you know, it's a big decision to have weight-loss surgery. But when you have a plan, you can focus on losing weight and living a healthier life. So what steps can you take to prepare for weight-loss surgery? Will you set some goals? Will you learn about how surgery can affect your life? How about asking family or friends for help? Write out your plan.  Then get ready. Where can you learn more? Go to http://mervin-ashish.info/. Enter Z241 in the search box to learn more about \"Learning About How to Prepare for Weight-Loss Surgery. \"  Current as of: October 13, 2016  Content Version: 11.4  © 6150-4540 Healthwise, [employer]. Care instructions adapted under license by [employer] (which disclaims liability or warranty for this information). If you have questions about a medical condition or this instruction, always ask your healthcare professional. [address] any warranty or liability for your use of this information.

## 2018-07-16 ENCOUNTER — APPOINTMENT (OUTPATIENT)
Dept: GENERAL RADIOLOGY | Age: 48
DRG: 620 | End: 2018-07-16
Attending: SURGERY
Payer: COMMERCIAL

## 2018-07-16 ENCOUNTER — ANESTHESIA EVENT (OUTPATIENT)
Dept: SURGERY | Age: 48
DRG: 620 | End: 2018-07-16
Payer: COMMERCIAL

## 2018-07-16 ENCOUNTER — HOSPITAL ENCOUNTER (INPATIENT)
Age: 48
LOS: 2 days | Discharge: HOME | DRG: 620 | End: 2018-07-18
Attending: SURGERY | Admitting: SURGERY
Payer: COMMERCIAL

## 2018-07-16 ENCOUNTER — ANESTHESIA (OUTPATIENT)
Dept: SURGERY | Age: 48
DRG: 620 | End: 2018-07-16
Payer: COMMERCIAL

## 2018-07-16 DIAGNOSIS — Z98.84 S/P GASTRIC BYPASS: Primary | ICD-10-CM

## 2018-07-16 PROBLEM — E66.01 MORBID OBESITY (HCC): Status: ACTIVE | Noted: 2018-07-16

## 2018-07-16 PROCEDURE — 74011000250 HC RX REV CODE- 250: Performed by: SURGERY

## 2018-07-16 PROCEDURE — 74011250636 HC RX REV CODE- 250/636: Performed by: ANESTHESIOLOGY

## 2018-07-16 PROCEDURE — 77030032490 HC SLV COMPR SCD KNE COVD -B: Performed by: SURGERY

## 2018-07-16 PROCEDURE — 77030018846 HC SOL IRR STRL H20 ICUM -A: Performed by: SURGERY

## 2018-07-16 PROCEDURE — 77030031139 HC SUT VCRL2 J&J -A: Performed by: SURGERY

## 2018-07-16 PROCEDURE — 77030016151 HC PROTCTR LNS DFOG COVD -B: Performed by: SURGERY

## 2018-07-16 PROCEDURE — 0BUT4JZ SUPPLEMENT DIAPHRAGM WITH SYNTHETIC SUBSTITUTE, PERCUTANEOUS ENDOSCOPIC APPROACH: ICD-10-PCS | Performed by: SURGERY

## 2018-07-16 PROCEDURE — 77030011640 HC PAD GRND REM COVD -A: Performed by: SURGERY

## 2018-07-16 PROCEDURE — 74011000250 HC RX REV CODE- 250

## 2018-07-16 PROCEDURE — 77030020263 HC SOL INJ SOD CL0.9% LFCR 1000ML: Performed by: SURGERY

## 2018-07-16 PROCEDURE — 74011250636 HC RX REV CODE- 250/636

## 2018-07-16 PROCEDURE — 76060000040 HC ANESTHESIA 4.5 TO 5 HR: Performed by: SURGERY

## 2018-07-16 PROCEDURE — 74011000258 HC RX REV CODE- 258: Performed by: SURGERY

## 2018-07-16 PROCEDURE — 77030035045 HC TRCR ENDOSC VRSPRT BLDLSS COVD -B: Performed by: SURGERY

## 2018-07-16 PROCEDURE — C1781 MESH (IMPLANTABLE): HCPCS | Performed by: SURGERY

## 2018-07-16 PROCEDURE — 77030009957 HC RELD ENDOSTCH COVD -C: Performed by: SURGERY

## 2018-07-16 PROCEDURE — 76210000016 HC OR PH I REC 1 TO 1.5 HR: Performed by: SURGERY

## 2018-07-16 PROCEDURE — 65660000000 HC RM CCU STEPDOWN

## 2018-07-16 PROCEDURE — 0D164ZA BYPASS STOMACH TO JEJUNUM, PERCUTANEOUS ENDOSCOPIC APPROACH: ICD-10-PCS | Performed by: SURGERY

## 2018-07-16 PROCEDURE — 0DQ44ZZ REPAIR ESOPHAGOGASTRIC JUNCTION, PERCUTANEOUS ENDOSCOPIC APPROACH: ICD-10-PCS | Performed by: SURGERY

## 2018-07-16 PROCEDURE — 77030039266 HC ADH SKN EXOFIN S2SG -A: Performed by: SURGERY

## 2018-07-16 PROCEDURE — 77030009965 HC RELD STPLR ENDOS COVD -D: Performed by: SURGERY

## 2018-07-16 PROCEDURE — 77030019908 HC STETH ESOPH SIMS -A: Performed by: ANESTHESIOLOGY

## 2018-07-16 PROCEDURE — 77030013079 HC BLNKT BAIR HGGR 3M -A: Performed by: ANESTHESIOLOGY

## 2018-07-16 PROCEDURE — 77030030826 HC RETRCTR WND ALEXS AMR -B: Performed by: SURGERY

## 2018-07-16 PROCEDURE — 77030035048 HC TRCR ENDOSC OPTCL COVD -B: Performed by: SURGERY

## 2018-07-16 PROCEDURE — 77030035051: Performed by: SURGERY

## 2018-07-16 PROCEDURE — 77030038157 HC DEV PWR CNTR DISP SIGNIA COVD -C: Performed by: SURGERY

## 2018-07-16 PROCEDURE — 77030027138 HC INCENT SPIROMETER -A

## 2018-07-16 PROCEDURE — 77030020061 HC IV BLD WRMR ADMIN SET 3M -B: Performed by: ANESTHESIOLOGY

## 2018-07-16 PROCEDURE — 77030009956 HC RELD ENDOSTCH COVD -B: Performed by: SURGERY

## 2018-07-16 PROCEDURE — 74011250637 HC RX REV CODE- 250/637: Performed by: SURGERY

## 2018-07-16 PROCEDURE — 77030018836 HC SOL IRR NACL ICUM -A: Performed by: SURGERY

## 2018-07-16 PROCEDURE — 77030035042 HC TRCR ENDOSC OPTCL BLDLSS COVD -D: Performed by: SURGERY

## 2018-07-16 PROCEDURE — 77030008728 HC TU GAST LAPSCP APOL -C: Performed by: SURGERY

## 2018-07-16 PROCEDURE — 77030012407 HC DRN WND BARD -B: Performed by: SURGERY

## 2018-07-16 PROCEDURE — 77030034154 HC SHR COAG HARM ACE J&J -F: Performed by: SURGERY

## 2018-07-16 PROCEDURE — 77030013567 HC DRN WND RESERV BARD -A: Performed by: SURGERY

## 2018-07-16 PROCEDURE — 77030002895 HC DEV VASC CLOSR COVD -B: Performed by: SURGERY

## 2018-07-16 PROCEDURE — 77030032435: Performed by: SURGERY

## 2018-07-16 PROCEDURE — 77030037367 HC STPLR ENDO TRI-STPLR COVD -D: Performed by: SURGERY

## 2018-07-16 PROCEDURE — 77030020782 HC GWN BAIR PAWS FLX 3M -B

## 2018-07-16 PROCEDURE — 77030002933 HC SUT MCRYL J&J -A: Performed by: SURGERY

## 2018-07-16 PROCEDURE — 77030008756 HC TU IRR SUC STRY -B: Performed by: SURGERY

## 2018-07-16 PROCEDURE — P9045 ALBUMIN (HUMAN), 5%, 250 ML: HCPCS

## 2018-07-16 PROCEDURE — 77030008437 HC REINF STRP REINF SEMGD WLGO -C: Performed by: SURGERY

## 2018-07-16 PROCEDURE — 76010000175 HC OR TIME 4 TO 4.5 HR INTENSV-TIER 1: Performed by: SURGERY

## 2018-07-16 PROCEDURE — 88307 TISSUE EXAM BY PATHOLOGIST: CPT | Performed by: SURGERY

## 2018-07-16 PROCEDURE — 71045 X-RAY EXAM CHEST 1 VIEW: CPT

## 2018-07-16 PROCEDURE — 77030037032 HC INSRT SCIS CLICKLLINE DISP STOR -B: Performed by: SURGERY

## 2018-07-16 PROCEDURE — 77030009852 HC PCH RTVR ENDOSC COVD -B: Performed by: SURGERY

## 2018-07-16 PROCEDURE — 77030020747 HC TU INSUF ENDOSC TELE -A: Performed by: SURGERY

## 2018-07-16 PROCEDURE — 0DJ08ZZ INSPECTION OF UPPER INTESTINAL TRACT, VIA NATURAL OR ARTIFICIAL OPENING ENDOSCOPIC: ICD-10-PCS | Performed by: SURGERY

## 2018-07-16 PROCEDURE — 77030008684 HC TU ET CUF COVD -B: Performed by: ANESTHESIOLOGY

## 2018-07-16 PROCEDURE — 77030008023 HC RELD SUT ENDOSC COVD -B: Performed by: SURGERY

## 2018-07-16 PROCEDURE — 74011250636 HC RX REV CODE- 250/636: Performed by: SURGERY

## 2018-07-16 PROCEDURE — 77030020053 HC ELECTRD LAPSCP COVD -B: Performed by: SURGERY

## 2018-07-16 PROCEDURE — 77030034850: Performed by: SURGERY

## 2018-07-16 PROCEDURE — 74011250636 HC RX REV CODE- 250/636: Performed by: NURSE PRACTITIONER

## 2018-07-16 PROCEDURE — 77030026438 HC STYL ET INTUB CARD -A: Performed by: ANESTHESIOLOGY

## 2018-07-16 PROCEDURE — 77030002916 HC SUT ETHLN J&J -A: Performed by: SURGERY

## 2018-07-16 DEVICE — MESH HERN W7XL10CM SYN TISS REINF BIOABSRB DISP BIO-A: Type: IMPLANTABLE DEVICE | Site: ABDOMEN | Status: FUNCTIONAL

## 2018-07-16 RX ORDER — VASOPRESSIN 20 U/ML
INJECTION PARENTERAL AS NEEDED
Status: DISCONTINUED | OUTPATIENT
Start: 2018-07-16 | End: 2018-07-16 | Stop reason: HOSPADM

## 2018-07-16 RX ORDER — HYDROMORPHONE HYDROCHLORIDE 2 MG/ML
INJECTION, SOLUTION INTRAMUSCULAR; INTRAVENOUS; SUBCUTANEOUS AS NEEDED
Status: DISCONTINUED | OUTPATIENT
Start: 2018-07-16 | End: 2018-07-16 | Stop reason: HOSPADM

## 2018-07-16 RX ORDER — OXYCODONE AND ACETAMINOPHEN 5; 325 MG/1; MG/1
1 TABLET ORAL AS NEEDED
Status: DISCONTINUED | OUTPATIENT
Start: 2018-07-16 | End: 2018-07-16 | Stop reason: HOSPADM

## 2018-07-16 RX ORDER — LIDOCAINE HYDROCHLORIDE 10 MG/ML
0.1 INJECTION, SOLUTION EPIDURAL; INFILTRATION; INTRACAUDAL; PERINEURAL AS NEEDED
Status: DISCONTINUED | OUTPATIENT
Start: 2018-07-16 | End: 2018-07-16 | Stop reason: HOSPADM

## 2018-07-16 RX ORDER — SUCCINYLCHOLINE CHLORIDE 20 MG/ML
INJECTION INTRAMUSCULAR; INTRAVENOUS AS NEEDED
Status: DISCONTINUED | OUTPATIENT
Start: 2018-07-16 | End: 2018-07-16 | Stop reason: HOSPADM

## 2018-07-16 RX ORDER — SCOLOPAMINE TRANSDERMAL SYSTEM 1 MG/1
1 PATCH, EXTENDED RELEASE TRANSDERMAL
Status: DISCONTINUED | OUTPATIENT
Start: 2018-07-16 | End: 2018-07-18 | Stop reason: HOSPADM

## 2018-07-16 RX ORDER — ROCURONIUM BROMIDE 10 MG/ML
INJECTION, SOLUTION INTRAVENOUS AS NEEDED
Status: DISCONTINUED | OUTPATIENT
Start: 2018-07-16 | End: 2018-07-16 | Stop reason: HOSPADM

## 2018-07-16 RX ORDER — NEOSTIGMINE METHYLSULFATE 1 MG/ML
INJECTION INTRAVENOUS AS NEEDED
Status: DISCONTINUED | OUTPATIENT
Start: 2018-07-16 | End: 2018-07-16 | Stop reason: HOSPADM

## 2018-07-16 RX ORDER — MIDAZOLAM HYDROCHLORIDE 1 MG/ML
0.5 INJECTION, SOLUTION INTRAMUSCULAR; INTRAVENOUS
Status: DISCONTINUED | OUTPATIENT
Start: 2018-07-16 | End: 2018-07-16 | Stop reason: HOSPADM

## 2018-07-16 RX ORDER — FENTANYL CITRATE 50 UG/ML
50 INJECTION, SOLUTION INTRAMUSCULAR; INTRAVENOUS AS NEEDED
Status: DISCONTINUED | OUTPATIENT
Start: 2018-07-16 | End: 2018-07-16 | Stop reason: HOSPADM

## 2018-07-16 RX ORDER — NALOXONE HYDROCHLORIDE 0.4 MG/ML
0.4 INJECTION, SOLUTION INTRAMUSCULAR; INTRAVENOUS; SUBCUTANEOUS AS NEEDED
Status: DISCONTINUED | OUTPATIENT
Start: 2018-07-16 | End: 2018-07-18 | Stop reason: HOSPADM

## 2018-07-16 RX ORDER — SODIUM CHLORIDE 9 MG/ML
1000 INJECTION, SOLUTION INTRAVENOUS CONTINUOUS
Status: DISCONTINUED | OUTPATIENT
Start: 2018-07-16 | End: 2018-07-16 | Stop reason: HOSPADM

## 2018-07-16 RX ORDER — MIDAZOLAM HYDROCHLORIDE 1 MG/ML
1 INJECTION, SOLUTION INTRAMUSCULAR; INTRAVENOUS AS NEEDED
Status: DISCONTINUED | OUTPATIENT
Start: 2018-07-16 | End: 2018-07-16 | Stop reason: HOSPADM

## 2018-07-16 RX ORDER — SODIUM CHLORIDE 0.9 % (FLUSH) 0.9 %
5-10 SYRINGE (ML) INJECTION EVERY 8 HOURS
Status: DISCONTINUED | OUTPATIENT
Start: 2018-07-16 | End: 2018-07-18 | Stop reason: HOSPADM

## 2018-07-16 RX ORDER — MIDAZOLAM HYDROCHLORIDE 1 MG/ML
INJECTION, SOLUTION INTRAMUSCULAR; INTRAVENOUS AS NEEDED
Status: DISCONTINUED | OUTPATIENT
Start: 2018-07-16 | End: 2018-07-16 | Stop reason: HOSPADM

## 2018-07-16 RX ORDER — ALBUMIN HUMAN 50 G/1000ML
SOLUTION INTRAVENOUS AS NEEDED
Status: DISCONTINUED | OUTPATIENT
Start: 2018-07-16 | End: 2018-07-16 | Stop reason: HOSPADM

## 2018-07-16 RX ORDER — DIPHENHYDRAMINE HYDROCHLORIDE 50 MG/ML
12.5 INJECTION, SOLUTION INTRAMUSCULAR; INTRAVENOUS AS NEEDED
Status: DISCONTINUED | OUTPATIENT
Start: 2018-07-16 | End: 2018-07-16 | Stop reason: HOSPADM

## 2018-07-16 RX ORDER — PHENYLEPHRINE HCL IN 0.9% NACL 0.4MG/10ML
SYRINGE (ML) INTRAVENOUS AS NEEDED
Status: DISCONTINUED | OUTPATIENT
Start: 2018-07-16 | End: 2018-07-16 | Stop reason: HOSPADM

## 2018-07-16 RX ORDER — SODIUM CHLORIDE, SODIUM LACTATE, POTASSIUM CHLORIDE, CALCIUM CHLORIDE 600; 310; 30; 20 MG/100ML; MG/100ML; MG/100ML; MG/100ML
125 INJECTION, SOLUTION INTRAVENOUS CONTINUOUS
Status: DISCONTINUED | OUTPATIENT
Start: 2018-07-16 | End: 2018-07-16 | Stop reason: HOSPADM

## 2018-07-16 RX ORDER — FENTANYL CITRATE 50 UG/ML
25 INJECTION, SOLUTION INTRAMUSCULAR; INTRAVENOUS
Status: COMPLETED | OUTPATIENT
Start: 2018-07-16 | End: 2018-07-16

## 2018-07-16 RX ORDER — SODIUM CHLORIDE 0.9 % (FLUSH) 0.9 %
5-10 SYRINGE (ML) INJECTION AS NEEDED
Status: DISCONTINUED | OUTPATIENT
Start: 2018-07-16 | End: 2018-07-16 | Stop reason: HOSPADM

## 2018-07-16 RX ORDER — KETAMINE HYDROCHLORIDE 10 MG/ML
INJECTION, SOLUTION INTRAMUSCULAR; INTRAVENOUS AS NEEDED
Status: DISCONTINUED | OUTPATIENT
Start: 2018-07-16 | End: 2018-07-16 | Stop reason: HOSPADM

## 2018-07-16 RX ORDER — LIDOCAINE HYDROCHLORIDE 20 MG/ML
INJECTION, SOLUTION EPIDURAL; INFILTRATION; INTRACAUDAL; PERINEURAL AS NEEDED
Status: DISCONTINUED | OUTPATIENT
Start: 2018-07-16 | End: 2018-07-16 | Stop reason: HOSPADM

## 2018-07-16 RX ORDER — ONDANSETRON 2 MG/ML
INJECTION INTRAMUSCULAR; INTRAVENOUS AS NEEDED
Status: DISCONTINUED | OUTPATIENT
Start: 2018-07-16 | End: 2018-07-16 | Stop reason: HOSPADM

## 2018-07-16 RX ORDER — SODIUM CHLORIDE 0.9 % (FLUSH) 0.9 %
5-10 SYRINGE (ML) INJECTION EVERY 8 HOURS
Status: DISCONTINUED | OUTPATIENT
Start: 2018-07-16 | End: 2018-07-16 | Stop reason: HOSPADM

## 2018-07-16 RX ORDER — GLYCOPYRROLATE 0.2 MG/ML
INJECTION INTRAMUSCULAR; INTRAVENOUS AS NEEDED
Status: DISCONTINUED | OUTPATIENT
Start: 2018-07-16 | End: 2018-07-16 | Stop reason: HOSPADM

## 2018-07-16 RX ORDER — METOPROLOL TARTRATE 5 MG/5ML
INJECTION INTRAVENOUS
Status: COMPLETED
Start: 2018-07-16 | End: 2018-07-16

## 2018-07-16 RX ORDER — SODIUM CHLORIDE, SODIUM LACTATE, POTASSIUM CHLORIDE, CALCIUM CHLORIDE 600; 310; 30; 20 MG/100ML; MG/100ML; MG/100ML; MG/100ML
125 INJECTION, SOLUTION INTRAVENOUS CONTINUOUS
Status: DISPENSED | OUTPATIENT
Start: 2018-07-16 | End: 2018-07-17

## 2018-07-16 RX ORDER — METOPROLOL TARTRATE 5 MG/5ML
5 INJECTION INTRAVENOUS EVERY 6 HOURS
Status: DISCONTINUED | OUTPATIENT
Start: 2018-07-16 | End: 2018-07-17

## 2018-07-16 RX ORDER — FENTANYL CITRATE 50 UG/ML
INJECTION, SOLUTION INTRAMUSCULAR; INTRAVENOUS AS NEEDED
Status: DISCONTINUED | OUTPATIENT
Start: 2018-07-16 | End: 2018-07-16 | Stop reason: HOSPADM

## 2018-07-16 RX ORDER — ONDANSETRON 2 MG/ML
4 INJECTION INTRAMUSCULAR; INTRAVENOUS AS NEEDED
Status: DISCONTINUED | OUTPATIENT
Start: 2018-07-16 | End: 2018-07-16 | Stop reason: HOSPADM

## 2018-07-16 RX ORDER — MORPHINE SULFATE 10 MG/ML
2 INJECTION, SOLUTION INTRAMUSCULAR; INTRAVENOUS
Status: DISCONTINUED | OUTPATIENT
Start: 2018-07-16 | End: 2018-07-16 | Stop reason: HOSPADM

## 2018-07-16 RX ORDER — SODIUM CHLORIDE 0.9 % (FLUSH) 0.9 %
5-10 SYRINGE (ML) INJECTION AS NEEDED
Status: DISCONTINUED | OUTPATIENT
Start: 2018-07-16 | End: 2018-07-18 | Stop reason: HOSPADM

## 2018-07-16 RX ORDER — EPHEDRINE SULFATE 50 MG/ML
INJECTION, SOLUTION INTRAVENOUS AS NEEDED
Status: DISCONTINUED | OUTPATIENT
Start: 2018-07-16 | End: 2018-07-16 | Stop reason: HOSPADM

## 2018-07-16 RX ORDER — MORPHINE SULFATE 2 MG/ML
2-4 INJECTION, SOLUTION INTRAMUSCULAR; INTRAVENOUS
Status: DISCONTINUED | OUTPATIENT
Start: 2018-07-16 | End: 2018-07-18 | Stop reason: HOSPADM

## 2018-07-16 RX ORDER — LORAZEPAM 2 MG/ML
1 INJECTION INTRAMUSCULAR
Status: DISCONTINUED | OUTPATIENT
Start: 2018-07-16 | End: 2018-07-18 | Stop reason: HOSPADM

## 2018-07-16 RX ORDER — SODIUM CHLORIDE 9 MG/ML
50 INJECTION, SOLUTION INTRAVENOUS CONTINUOUS
Status: DISCONTINUED | OUTPATIENT
Start: 2018-07-16 | End: 2018-07-16 | Stop reason: HOSPADM

## 2018-07-16 RX ORDER — ACETAMINOPHEN 10 MG/ML
1000 INJECTION, SOLUTION INTRAVENOUS EVERY 8 HOURS
Status: COMPLETED | OUTPATIENT
Start: 2018-07-16 | End: 2018-07-17

## 2018-07-16 RX ORDER — DIPHENHYDRAMINE HYDROCHLORIDE 50 MG/ML
25 INJECTION, SOLUTION INTRAMUSCULAR; INTRAVENOUS
Status: ACTIVE | OUTPATIENT
Start: 2018-07-16 | End: 2018-07-17

## 2018-07-16 RX ORDER — SODIUM CHLORIDE, SODIUM LACTATE, POTASSIUM CHLORIDE, CALCIUM CHLORIDE 600; 310; 30; 20 MG/100ML; MG/100ML; MG/100ML; MG/100ML
INJECTION, SOLUTION INTRAVENOUS
Status: DISCONTINUED | OUTPATIENT
Start: 2018-07-16 | End: 2018-07-16 | Stop reason: HOSPADM

## 2018-07-16 RX ORDER — ENOXAPARIN SODIUM 100 MG/ML
40 INJECTION SUBCUTANEOUS EVERY 24 HOURS
Status: DISCONTINUED | OUTPATIENT
Start: 2018-07-17 | End: 2018-07-18 | Stop reason: HOSPADM

## 2018-07-16 RX ORDER — ACETAMINOPHEN 10 MG/ML
INJECTION, SOLUTION INTRAVENOUS AS NEEDED
Status: DISCONTINUED | OUTPATIENT
Start: 2018-07-16 | End: 2018-07-16 | Stop reason: HOSPADM

## 2018-07-16 RX ORDER — ENOXAPARIN SODIUM 100 MG/ML
40 INJECTION SUBCUTANEOUS
Status: COMPLETED | OUTPATIENT
Start: 2018-07-16 | End: 2018-07-16

## 2018-07-16 RX ORDER — ONDANSETRON 2 MG/ML
4 INJECTION INTRAMUSCULAR; INTRAVENOUS
Status: DISCONTINUED | OUTPATIENT
Start: 2018-07-16 | End: 2018-07-18 | Stop reason: HOSPADM

## 2018-07-16 RX ORDER — PROPOFOL 10 MG/ML
INJECTION, EMULSION INTRAVENOUS AS NEEDED
Status: DISCONTINUED | OUTPATIENT
Start: 2018-07-16 | End: 2018-07-16 | Stop reason: HOSPADM

## 2018-07-16 RX ORDER — DEXAMETHASONE SODIUM PHOSPHATE 4 MG/ML
INJECTION, SOLUTION INTRA-ARTICULAR; INTRALESIONAL; INTRAMUSCULAR; INTRAVENOUS; SOFT TISSUE AS NEEDED
Status: DISCONTINUED | OUTPATIENT
Start: 2018-07-16 | End: 2018-07-16 | Stop reason: HOSPADM

## 2018-07-16 RX ORDER — BUPIVACAINE HYDROCHLORIDE 5 MG/ML
50 INJECTION, SOLUTION EPIDURAL; INTRACAUDAL ONCE
Status: DISCONTINUED | OUTPATIENT
Start: 2018-07-16 | End: 2018-07-16 | Stop reason: HOSPADM

## 2018-07-16 RX ORDER — KETOROLAC TROMETHAMINE 30 MG/ML
15 INJECTION, SOLUTION INTRAMUSCULAR; INTRAVENOUS EVERY 6 HOURS
Status: COMPLETED | OUTPATIENT
Start: 2018-07-16 | End: 2018-07-17

## 2018-07-16 RX ADMIN — METOPROLOL TARTRATE 5 MG: 5 INJECTION, SOLUTION INTRAVENOUS at 13:50

## 2018-07-16 RX ADMIN — ROCURONIUM BROMIDE 30 MG: 10 INJECTION, SOLUTION INTRAVENOUS at 08:54

## 2018-07-16 RX ADMIN — GLYCOPYRROLATE 0.4 MG: 0.2 INJECTION INTRAMUSCULAR; INTRAVENOUS at 10:48

## 2018-07-16 RX ADMIN — ROCURONIUM BROMIDE 20 MG: 10 INJECTION, SOLUTION INTRAVENOUS at 09:44

## 2018-07-16 RX ADMIN — MIDAZOLAM HYDROCHLORIDE 2 MG: 1 INJECTION, SOLUTION INTRAMUSCULAR; INTRAVENOUS at 07:28

## 2018-07-16 RX ADMIN — NEOSTIGMINE METHYLSULFATE 4 MG: 1 INJECTION INTRAVENOUS at 10:55

## 2018-07-16 RX ADMIN — Medication 10 ML: at 18:02

## 2018-07-16 RX ADMIN — FENTANYL CITRATE 25 MCG: 50 INJECTION, SOLUTION INTRAMUSCULAR; INTRAVENOUS at 13:03

## 2018-07-16 RX ADMIN — FENTANYL CITRATE 50 MCG: 50 INJECTION, SOLUTION INTRAMUSCULAR; INTRAVENOUS at 09:46

## 2018-07-16 RX ADMIN — LORAZEPAM 1 MG: 2 INJECTION INTRAMUSCULAR; INTRAVENOUS at 20:37

## 2018-07-16 RX ADMIN — ACETAMINOPHEN 1000 MG: 10 INJECTION, SOLUTION INTRAVENOUS at 16:34

## 2018-07-16 RX ADMIN — LIDOCAINE HYDROCHLORIDE 60 MG: 20 INJECTION, SOLUTION EPIDURAL; INFILTRATION; INTRACAUDAL; PERINEURAL at 07:34

## 2018-07-16 RX ADMIN — KETOROLAC TROMETHAMINE 15 MG: 30 INJECTION, SOLUTION INTRAMUSCULAR; INTRAVENOUS at 18:03

## 2018-07-16 RX ADMIN — FENTANYL CITRATE 100 MCG: 50 INJECTION, SOLUTION INTRAMUSCULAR; INTRAVENOUS at 07:34

## 2018-07-16 RX ADMIN — SODIUM CHLORIDE, SODIUM LACTATE, POTASSIUM CHLORIDE, CALCIUM CHLORIDE: 600; 310; 30; 20 INJECTION, SOLUTION INTRAVENOUS at 07:49

## 2018-07-16 RX ADMIN — EPHEDRINE SULFATE 10 MG: 50 INJECTION, SOLUTION INTRAVENOUS at 09:35

## 2018-07-16 RX ADMIN — MORPHINE SULFATE 4 MG: 2 INJECTION, SOLUTION INTRAMUSCULAR; INTRAVENOUS at 20:25

## 2018-07-16 RX ADMIN — ROCURONIUM BROMIDE 10 MG: 10 INJECTION, SOLUTION INTRAVENOUS at 07:34

## 2018-07-16 RX ADMIN — ENOXAPARIN SODIUM 40 MG: 40 INJECTION, SOLUTION INTRAVENOUS; SUBCUTANEOUS at 06:32

## 2018-07-16 RX ADMIN — SUCCINYLCHOLINE CHLORIDE 160 MG: 20 INJECTION INTRAMUSCULAR; INTRAVENOUS at 07:34

## 2018-07-16 RX ADMIN — VASOPRESSIN 2 UNITS: 20 INJECTION PARENTERAL at 09:52

## 2018-07-16 RX ADMIN — KETOROLAC TROMETHAMINE 15 MG: 30 INJECTION, SOLUTION INTRAMUSCULAR; INTRAVENOUS at 13:19

## 2018-07-16 RX ADMIN — SODIUM CHLORIDE, SODIUM LACTATE, POTASSIUM CHLORIDE, CALCIUM CHLORIDE: 600; 310; 30; 20 INJECTION, SOLUTION INTRAVENOUS at 09:38

## 2018-07-16 RX ADMIN — MIDAZOLAM HYDROCHLORIDE 3 MG: 1 INJECTION, SOLUTION INTRAMUSCULAR; INTRAVENOUS at 07:24

## 2018-07-16 RX ADMIN — KETAMINE HYDROCHLORIDE 25 MG: 10 INJECTION, SOLUTION INTRAMUSCULAR; INTRAVENOUS at 07:34

## 2018-07-16 RX ADMIN — VASOPRESSIN 2 UNITS: 20 INJECTION PARENTERAL at 10:03

## 2018-07-16 RX ADMIN — FENTANYL CITRATE 25 MCG: 50 INJECTION, SOLUTION INTRAMUSCULAR; INTRAVENOUS at 13:52

## 2018-07-16 RX ADMIN — FENTANYL CITRATE 25 MCG: 50 INJECTION, SOLUTION INTRAMUSCULAR; INTRAVENOUS at 12:51

## 2018-07-16 RX ADMIN — CEFOTETAN DISODIUM 2 G: 2 INJECTION, POWDER, FOR SOLUTION INTRAMUSCULAR; INTRAVENOUS at 07:48

## 2018-07-16 RX ADMIN — MORPHINE SULFATE 2 MG: 2 INJECTION, SOLUTION INTRAMUSCULAR; INTRAVENOUS at 15:35

## 2018-07-16 RX ADMIN — EPHEDRINE SULFATE 10 MG: 50 INJECTION, SOLUTION INTRAVENOUS at 08:39

## 2018-07-16 RX ADMIN — EPHEDRINE SULFATE 5 MG: 50 INJECTION, SOLUTION INTRAVENOUS at 08:35

## 2018-07-16 RX ADMIN — Medication 10 ML: at 16:34

## 2018-07-16 RX ADMIN — Medication 80 MCG: at 07:37

## 2018-07-16 RX ADMIN — VASOPRESSIN 2 UNITS: 20 INJECTION PARENTERAL at 10:21

## 2018-07-16 RX ADMIN — Medication 80 MCG: at 07:41

## 2018-07-16 RX ADMIN — PROPOFOL 200 MG: 10 INJECTION, EMULSION INTRAVENOUS at 07:34

## 2018-07-16 RX ADMIN — SODIUM CHLORIDE, SODIUM LACTATE, POTASSIUM CHLORIDE, CALCIUM CHLORIDE: 600; 310; 30; 20 INJECTION, SOLUTION INTRAVENOUS at 07:22

## 2018-07-16 RX ADMIN — ACETAMINOPHEN 1000 MG: 10 INJECTION, SOLUTION INTRAVENOUS at 09:16

## 2018-07-16 RX ADMIN — EPHEDRINE SULFATE 10 MG: 50 INJECTION, SOLUTION INTRAVENOUS at 08:52

## 2018-07-16 RX ADMIN — ALBUMIN HUMAN 250 ML: 50 SOLUTION INTRAVENOUS at 08:22

## 2018-07-16 RX ADMIN — ROCURONIUM BROMIDE 25 MG: 10 INJECTION, SOLUTION INTRAVENOUS at 10:16

## 2018-07-16 RX ADMIN — ONDANSETRON 4 MG: 2 INJECTION INTRAMUSCULAR; INTRAVENOUS at 10:31

## 2018-07-16 RX ADMIN — ROCURONIUM BROMIDE 40 MG: 10 INJECTION, SOLUTION INTRAVENOUS at 07:38

## 2018-07-16 RX ADMIN — SODIUM CHLORIDE, SODIUM LACTATE, POTASSIUM CHLORIDE, AND CALCIUM CHLORIDE 125 ML/HR: 600; 310; 30; 20 INJECTION, SOLUTION INTRAVENOUS at 14:01

## 2018-07-16 RX ADMIN — Medication 80 MCG: at 07:43

## 2018-07-16 RX ADMIN — DEXAMETHASONE SODIUM PHOSPHATE 8 MG: 4 INJECTION, SOLUTION INTRA-ARTICULAR; INTRALESIONAL; INTRAMUSCULAR; INTRAVENOUS; SOFT TISSUE at 07:41

## 2018-07-16 RX ADMIN — HYDROMORPHONE HYDROCHLORIDE 0.5 MG: 2 INJECTION, SOLUTION INTRAMUSCULAR; INTRAVENOUS; SUBCUTANEOUS at 10:59

## 2018-07-16 RX ADMIN — ALBUMIN HUMAN 250 ML: 50 SOLUTION INTRAVENOUS at 07:52

## 2018-07-16 RX ADMIN — METOPROLOL TARTRATE 5 MG: 5 INJECTION, SOLUTION INTRAVENOUS at 18:57

## 2018-07-16 RX ADMIN — CEFOTETAN DISODIUM 2 G: 2 INJECTION, POWDER, FOR SOLUTION INTRAMUSCULAR; INTRAVENOUS at 20:25

## 2018-07-16 RX ADMIN — FENTANYL CITRATE 25 MCG: 50 INJECTION, SOLUTION INTRAMUSCULAR; INTRAVENOUS at 12:29

## 2018-07-16 NOTE — ROUTINE PROCESS
1840:  Pt have had numbness to fingers since arrival from PACU. He reports his left arm at times jerks on its own. Pt otherwise mobile with steady gait, speech clear, no drooping, no drift but left arm feels heavy. Spoke with  for assistance, he suggest changing PIV site to see it that will help. Pt did not want another IV placed at this time.

## 2018-07-16 NOTE — IP AVS SNAPSHOT
Summary of Care Report The Summary of Care report has been created to help improve care coordination. Users with access to [facility] or [address] (Web-based application) may access additional patient information including the Discharge Summary. If you are not currently a [address] user and need more information, please call the number listed below in the [place] [address] section and ask to be connected with Medical Records. Facility Information Name Address Phone [address] [id number] [phone] Patient Information Patient Name Sex  Nas Irby ([id number]) Male 1970 Discharge Information Admitting Provider Service Area Unit Purvi Flores MD / [address] 4 Surg/Bariatrics / [phone] Discharge Provider Discharge Date/Time Discharge Disposition Destination (none) 2018 Morning (Pending) AHR (none) Patient Language Language ENGLISH [13] Hospital Problems as of 2018  Reviewed: 2018 10:24 AM by Purvi Flores MD  
  
  
  
 Class Noted - Resolved Last Modified POA Active Problems Morbid obesity ([facility] [address]  2018 - Present 2018 by Purvi Flores MD Unknown Entered by Purvi Flores MD  
  
Non-Hospital Problems as of 2018  Reviewed: 2018 10:24 AM by Purvi Flores MD  
  
  
  
 Class Noted - Resolved Last Modified Active Problems Morbid obesity with BMI of 40.0-44.9, adult ([facility] [address]  2017 - Present 2017 by Aliyah Hoover NP Entered by Aliyah Hoover NP Chronic GERD  2017 - Present 2017 by Aliyah Hoover NP Entered by Aliyah Hoover NP Overview Signed 2017  9:58 AM by Aliyah Hoover NP Hx Nissen fundoplication; \"slipped\" and recurrent hiatal hernia Essential hypertension  2/20/2018 - Present 2/20/2018 by Lianet Goodman MD  
  Entered by Lianet Goodman MD  
  CHONG (obstructive sleep apnea)  2/20/2018 - Present 2/20/2018 by Lianet Goodman MD  
  Entered by Lianet Goodman MD  
  Fine's esophagus without dysplasia  2/20/2018 - Present 2/20/2018 by Lianet Goodman MD  
  Entered by Lianet Goodman MD  
  Hiatal hernia, recurrent  2/20/2018 - Present 2/20/2018 by Lianet Goodman MD  
  Entered by Lianet Goodman MD  
  Status post Nissen fundoplication  9/29/6947 - Present 2/20/2018 by Lianet Goodman MD  
  Entered by Lianet Goodman MD  
  Hypercholesteremia  7/4/2018 - Present 7/4/2018 by Lianet Goodman MD  
  Entered by Lianet Goodman MD  
  
You are allergic to the following No active allergies Current Discharge Medication List  
  
START taking these medications Dose & Instructions Dispensing Information Comments HYDROmorphone 2 mg tablet Commonly known as:  DILAUDID Dose:  2-4 mg Take 1-2 Tabs by mouth every four (4) hours as needed. Max Daily Amount: 24 mg. Quantity:  30 Tab Refills:  0  
   
 potassium chloride SA 15 mEq tablet Commonly known as:  KLOR-CON M15 Dose:  15 mEq Take 1 Tab by mouth two (2) times a day for 2 days. Quantity:  4 Tab Refills:  0 CONTINUE these medications which have NOT CHANGED Dose & Instructions Dispensing Information Comments  
 allopurinol 300 mg tablet Commonly known as:  Christell Paul Dose:  300 mg Take 300 mg by mouth. Refills:  0  
   
 amLODIPine 10 mg tablet Commonly known as:  Elyssa Samantha Dose:  10 mg Take 10 mg by mouth daily. Refills:  0 Dexlansoprazole 60 mg Cpdb Dose:  60 mg Take 60 mg by mouth. Refills:  0  
   
 ergocalciferol 50,000 unit capsule Commonly known as:  ERGOCALCIFEROL Dose:  66652 Units Take 1 Cap by mouth every seven (7) days. Quantity:  12 Cap Refills:  0 labetalol 100 mg tablet Commonly known as:  Jasmyne Pale Dose:  300 mg Take 300 mg by mouth two (2) times a day. Refills:  0  
   
 lisinopril 40 mg tablet Commonly known as:  Franki Shutters Dose:  40 mg Take 40 mg by mouth daily. Refills:  0  
   
 ondansetron 4 mg disintegrating tablet Commonly known as:  ZOFRAN ODT Dose:  4 mg Take 1 Tab by mouth every eight (8) hours as needed for Nausea. Quantity:  30 Tab Refills:  0  
   
 sertraline 100 mg tablet Commonly known as:  ZOLOFT Dose:  100 mg Take 100 mg by mouth. Refills:  0  
   
 simvastatin 10 mg tablet Commonly known as:  ZOCOR Dose:  10 mg Take 10 mg by mouth. Refills:  0  
   
 traZODone 100 mg tablet Commonly known as:  Debrandall Gunmeghann Dose:  100 mg Take 100 mg by mouth. Refills:  0 STOP taking these medications Comments  
 hydroCHLOROthiazide 25 mg tablet Commonly known as:  HYDRODIURIL Surgery Information ID Date/Time Status Primary Surgeon All Procedures Location [id number] 7/16/2018 Derrell Mishra MD LAPAROSCOPIC TAKE DOWN NISSEN FUNDOPLICATION ,LAPAROSCOPIC HIATAL HERNIA REPAIR WITH MESH,  LAPAROSCOPIC GASTRIC BYPASS PARTIAL GASTRECTOMY AND EGD, EGD  
ESOPHAGOGASTRODUODENOSCOPY (EGD) [facility] MAIN OR Follow-up Information Follow up With Details Comments Contact Info Austyn Mayen MD   Patient can only remember the practice name and not the physician Discharge Instructions Gastric Bypass Patient Discharge Instructions General Surgery [facility] 
[phone] 1. Activities: You may be active walking, stair climbing, and doing light weight activities with one to two-pound weights, sitting in a chair using different arm motions. Major restrictions include driving until your first office visit and lifting anything heavier than ten pounds.  It is very important that you walk frequently. In addition to walking, you should continue to use your incentive spirometer (breathing exercises) throughout the day. 2. Caring for vour incision (s}: Your surgical incision(s) will be covered with Derma bond (super glue). You may shower as desired and simply pat dry the area over the incision(s). There may occasionally be seepage of yellowish to yellowish-maroon dissolved fat from your wound, which is of no major concern as long as the wound is not red, hardened in the area of the drainage, or if the drainage has a foul smell. If there are any questions, call our office for further instructions. If there is this type of dissolved fat drainage,  the shower and gently express the fluid from your wound. Then keep gauze pads over the area to protect both the wound and your clothes. 3. When to call the office immediately: 
 
 
*Chest pain (not associated with eating/drinking) *Shortness of breath (more than normal) *Sudden pain and/or redness in calf *Fever greater than 101F 
*Persistent nausea and/or vomiting (unable to keep down any liquids) *Bleeding from incision(s) *Severe abdominal pain *Any other concerning symptoms 4. Diet: There are three main priorities as far as your diet is concerned--- 
 
a. Clear Liquids-drink from 1 oz. cups or standard shot glass. These liquids are non-carbonated, no added sugar, and not irritating to your stomach. They may include water, tea, coffee\" 100% no added sugar juices (avoid citrus) , clear broth, blenderized clear soups such as [employer] Healthy Request Chicken Noodle and Chicken & Rice, Sugar-free products including popsicles, Mark-Aid, and Crystal Lite. b. Vitamins: Multi-vitamin with iron in the morning and evening, making sure 
it's not the first thing or the last thing taken. The other Vitamin B-12 and 
vitamin A & D capsule may be taken once during the day anytime that you 
can make a routine of it. c. Protein Intake: During your initial two-three weeks when your body is switching from burning glucose to directly burning fat, there is an attempt by your body to use protein as a fuel. To protect that protein, we like you to exercise as listed above, and to try to take in 50-60 grams of protein per day. This can be done with four 8 oz. serving of skim milk and Low Carb [place] Instant Breakfast. Each 8 oz. should be considered a meal-breakfast, lunch, or supper, and during this mealtime it should be the sole ingested substance. Stop your clear liquids for 20 minutes before your start on the instant breakfast, which is sipped 1 oz. at a time. You may also try the following substitute for [place] Instant Breakfast: skim milk-fat free powdered milk + Egg Beaters + flavor extract. If desired, ice may be added and blenderized in the . If the milk upsets your stomach, you may purchase Lactaid tablets from any drug store. Lactaid skim milk may be purchased at most major supermarkets. Another alternative is Boost Diabetic, which is Lactose-free and ready to drink. There are many protein supplements on the market. Whey and Soy based protein powders/drinks are acceptable. If you have any questions about specific products please call the office. If you are having difficulty with your diet, please call the office. 5. Medications: Take no more than 2 pills at a time. Wait 20 minutes between pills. a. Vitamins as listed above---multi-vitamin with iron twice a day, B-12 once a day, vitamin A& D once a day. b. Acid reducing medicineWill be prescribed by your surgeon at discharge. c. Mylanta Plusone to two teaspoons as needed for belching or gas (other gas relieving medicines are also acceptable Beano, GasX, etc). d. Bowel Regulationmild laxatives are permissible such as Milk of Magnesia, Dulcolax (either by mouth or suppository).   If you are accustomed to using a q8tzdefoug laxative not mentioned, you may continue to use it. Fibercon tablets or Benefiber may be taken as a fiber supplement to regulate bowel movements. Fibercon tablets should be broken in half and taken in half and taken one half in the morning and one half in the evening. Benefiber (1 tsp.) can be added to your protein drink(s). It has no taste or added thickness. Imodium AD may be taken as needed for diarrhea. 
 
e. Pain medication-one to two every four hours as needed for pain control. If pain is mild, try extra-strength Tylenol first. 
 
f. Do not take any pain or arthritis medication such as Aspirin, Advil, Aleve, Naprosyn, or any other nonsteroidal anti-inflammatory medication unless approved by your surgeon. A Tylenol product is OK. 
 
g. Preadmission medications may be resumed, but this should be discussed without your doctor before your discharge from the hospital. 
 
Future Appointments Date Time Provider Telly Santana 7/30/2018 9:20 AM Andrew Bourne NP CAITLYN STEIN SCHED  
8/13/2018 9:20 AM JOSEPH Mccarthy SCHED  
8/27/2018 9:20 AM JOSEPH Mccarthy Chart Review Routing History Recipient Method Report Sent By Bear Calderon In [facility] Karishma Ontiveros MD [[id number]] 7/17/2018  6:29 AM 07/17/2018 Barb Kim In [facility] Karishma Ontiveros MD [[id number]] 7/17/2018  6:29 AM 07/17/2018 Han Wen In [facility] Karishma Ontiveros MD [[id number]] 7/17/2018  6:29 AM 07/17/2018

## 2018-07-16 NOTE — PERIOP NOTES
TRANSFER - OUT REPORT:    Verbal report given to Iraida (name) on Rob Roth  being transferred to [phone] (unit) for routine post - op       Report consisted of patients Situation, Background, Assessment and   Recommendations(SBAR). Time Pre op antibiotic given:0748  Anesthesia Stop time: 9324  Turk Present on Transfer to floor:n  Order for Turk on Chart:n  Discharge Prescriptions with Chart:n    Information from the following report(s) SBAR, OR Summary, Intake/Output, MAR and Accordion was reviewed with the receiving nurse. Opportunity for questions and clarification was provided. Is the patient on 02? YES       L/Min 2       Other     Is the patient on a monitor? YES    Is the nurse transporting with the patient? NO may travel w/o nurse/moniter    Surgical Waiting Area notified of patient's transfer from PACU?  YES      The following personal items collected during your admission accompanied patient upon transfer:   Dental Appliance: Dental Appliances: None  Vision:    Hearing Aid:    Jewelry:    Clothing:    Other Valuables:    Valuables sent to safe:      Clothes and glasses to floor with pt

## 2018-07-16 NOTE — H&P
HISTORY OF PRESENT ILLNESS       The patient is a 56 y.o. morbidly obese male here for history and physical for Nissen fundoplication takedown, laparoscopic gastric byass and hiatal hernia repair with Dr. Renato Gillette 7/16/18  surgery. Body mass index is 41.05 kg/(m^2).            Visit Vitals    /90 (BP 1 Location: Left arm, BP Patient Position: Sitting)    Pulse 88    Temp 98.2 °F (36.8 °C) (Oral)    Resp 16    Ht 5' 9\" (1.753 m)    Wt 294 lb (133.4 kg)    SpO2 94%    BMI 43.42 kg/m2                 Patient Active Problem List     Diagnosis Date Noted    Essential hypertension 02/20/2018    CHONG (obstructive sleep apnea) 02/20/2018    Fine's esophagus without dysplasia 02/20/2018    Hiatal hernia, recurrent 02/20/2018    Status post Nissen fundoplication 48/37/3126    Morbid obesity with BMI of 40.0-44.9, adult ([facility] [address] 09/01/2017    Chronic GERD 09/01/2017           Past Medical History:   Diagnosis Date    Anxiety      Fine esophagus       last EGD 2015     Dyspepsia and other specified disorders of function of stomach      GERD (gastroesophageal reflux disease)      Hypertension      Ill-defined condition       DEPRESSION     Morbid obesity (HCC)      CHONG (obstructive sleep apnea)       on BiPap    Paraesophageal hernia              Past Surgical History:   Procedure Laterality Date    HX GI         [place] fundoplication Dr. Judy Titus in [place]    [address]   2009     HIATAL C [address] HX ORTHOPAEDIC   2004     left ankle reconstruction    HX ORTHOPAEDIC   2005     RIGHT PINKIE    HX RHINOPLASTY        HX TONSILLECTOMY                  Social History    Substance Use Topics     Smoking status: Never Smoker     Smokeless tobacco: Never Used     Alcohol use Yes         Comment: 1 x month or less               Family History   Problem Relation Age of Onset    Cancer Mother         SKIN    Diabetes Mother      Hypertension Mother      Hypertension Father      Parkinson's Disease Father      Lupus Sister      Sickle Cell Anemia Brother                Prior to Admission medications    Medication Sig Start Date End Date Taking? Authorizing Provider   AMLODIPINE BESYLATE, BULK, 100 mg by Does Not Apply route two (2) times a day.     Yes Historical Provider   labetalol (NORMODYNE) 100 mg tablet Take 300 mg by mouth two (2) times a day.     Yes Historical Provider   allopurinol (ZYLOPRIM) 300 mg tablet Take 300 mg by mouth.     Yes Historical Provider   lisinopril (PRINIVIL, ZESTRIL) 40 mg tablet Take 40 mg by mouth.     Yes Historical Provider   hydroCHLOROthiazide (HYDRODIURIL) 25 mg tablet Take 25 mg by mouth.     Yes Historical Provider   Dexlansoprazole 60 mg CpDB Take 60 mg by mouth.     Yes Historical Provider   simvastatin (ZOCOR) 10 mg tablet Take 10 mg by mouth.     Yes Historical Provider   sertraline (ZOLOFT) 100 mg tablet Take 100 mg by mouth.     Yes Historical Provider   traZODone (DESYREL) 100 mg tablet Take 100 mg by mouth.     Yes Historical Provider      No Known Allergies      Review of Systems   Constitutional: Negative for chills, fever and malaise/fatigue. HENT: Positive for congestion. Negative for hearing loss, sinus pain, sore throat and tinnitus. Eyes: Negative for blurred vision, double vision, pain, discharge and redness. Wears glasses   Respiratory: Negative for cough, sputum production, shortness of breath and wheezing. Cardiovascular: Negative for chest pain, palpitations and leg swelling. Gastrointestinal: Positive for heartburn. Negative for abdominal pain, constipation, diarrhea, nausea and vomiting. Genitourinary: Negative for dysuria, frequency and urgency. Musculoskeletal: Positive for joint pain and neck pain. Negative for back pain. Skin: Negative for itching and rash. Neurological: Negative for dizziness, seizures and headaches. Psychiatric/Behavioral: Negative for depression (on zoloft).        Physical Exam Visit Vitals    BP (!) 151/97 (BP Patient Position: At rest)    Pulse 75    Temp 98.2 °F (36.8 °C)    Resp 16    Ht 5' 9\" (1.753 m)    Wt 278 lb (126.1 kg)    SpO2 97%    BMI 41.05 kg/m2       Constitutional: He is oriented to person, place, and time. He appears well-developed and well-nourished. HENT:   Head: Normocephalic. Neck: Normal range of motion. Neck supple. Cardiovascular: Normal rate, regular rhythm and normal heart sounds. Pulmonary/Chest: Effort normal and breath sounds normal.   Abdominal: Soft. Bowel sounds are normal. He exhibits no distension. There is no tenderness. No masses or hernias noted. Musculoskeletal: Normal range of motion. Neurological: He is alert and oriented to person, place, and time. Skin: Skin is warm and dry. Psychiatric: He has a normal mood and affect.         Assessment:      Morbid obesity with body mass index of 41.05. Co-morbids listed above.     Plan:     Patient is schedule for Laparoscopic Nissen fundoplication takedown,  gastric byass and paraesophageal hernia repair with Dr. Kristin Amos 7/16/18 with Dr. Kristin Amos on 7/16/2018 at [facility]. We discussed that this procedure is complex and is equivalent to revisional bariatric surgery with 3-5 x higher risks than primary gastric bypass given that fundoplication takedown, mediastinal dissection, hiatal hernia repair will be required. Reviewed risks to include but not limited to bleeding, infection, VTE, leak, conversion to open procedure, ulcer, esophageal injury; we also discussed that if esophageal injury occurred, repair and cessation of the procedure with plans for staged gastric bypass may be necessary-he acknowledges understanding and desires to proceed. All questions answered.

## 2018-07-16 NOTE — ANESTHESIA PREPROCEDURE EVALUATION
Anesthetic History   No history of anesthetic complications            Review of Systems / Medical History  Patient summary reviewed, nursing notes reviewed and pertinent labs reviewed    Pulmonary  Within defined limits      Sleep apnea           Neuro/Psych   Within defined limits           Cardiovascular  Within defined limits  Hypertension                   GI/Hepatic/Renal  Within defined limits   GERD           Endo/Other  Within defined limits      Morbid obesity     Other Findings              Physical Exam    Airway  Mallampati: I  TM Distance: > 6 cm  Neck ROM: normal range of motion   Mouth opening: Normal     Cardiovascular  Regular rate and rhythm,  S1 and S2 normal,  no murmur, click, rub, or gallop             Dental  No notable dental hx       Pulmonary  Breath sounds clear to auscultation               Abdominal  GI exam deferred       Other Findings            Anesthetic Plan    ASA: 3  Anesthesia type: general          Induction: Intravenous  Anesthetic plan and risks discussed with: Patient

## 2018-07-16 NOTE — ANESTHESIA POSTPROCEDURE EVALUATION
Post-Anesthesia Evaluation and Assessment    Patient: John Penn MRN: [id number]  SSN: [id number]    YOB: 1970  Age: 56 y.o. Sex: male       Cardiovascular Function/Vital Signs  Visit Vitals    /70    Pulse 79    Temp 36.5 °C (97.7 °F)    Resp 16    Ht 5' 9\" (1.753 m)    Wt 126.1 kg (278 lb)    SpO2 94%    BMI 41.05 kg/m2       Patient is status post general anesthesia for Procedure(s):  LAPAROSCOPIC TAKE DOWN NISSEN FUNDOPLICATION ,LAPAROSCOPIC HIATAL HERNIA REPAIR WITH MESH,  LAPAROSCOPIC GASTRIC BYPASS PARTIAL GASTRECTOMY AND EGD, EGD   ESOPHAGOGASTRODUODENOSCOPY (EGD). Nausea/Vomiting: None    Postoperative hydration reviewed and adequate. Pain:  Pain Scale 1: Numeric (0 - 10) (07/16/18 1229)  Pain Intensity 1: 4 (07/16/18 1229)   Managed    Neurological Status:   Neuro (WDL): Within Defined Limits (07/16/18 1203)  Neuro  LUE Motor Response: Purposeful (07/16/18 1203)  LLE Motor Response: Purposeful (07/16/18 1203)  RUE Motor Response: Purposeful (07/16/18 1203)  RLE Motor Response: Purposeful (07/16/18 1203)   At baseline    Mental Status and Level of Consciousness: Arousable    Pulmonary Status:   O2 Device: Nasal cannula (07/16/18 1210)   Adequate oxygenation and airway patent    Complications related to anesthesia: None    Post-anesthesia assessment completed.  No concerns    Signed By: Amanda Guidry MD     July 16, 2018

## 2018-07-16 NOTE — ROUTINE PROCESS
TRANSFER - IN REPORT:    Verbal report received from Nirav Samano [id number] (name) on Jenn Yap  being received from PACU (unit) for routine post - op      Report consisted of patients Situation, Background, Assessment and   Recommendations(SBAR). Information from the following report(s) SBAR, Kardex, OR Summary, Procedure Summary, Intake/Output, MAR and Cardiac Rhythm NSR 70s was reviewed with the receiving nurse. Opportunity for questions and clarification was provided. Assessment completed upon patients arrival to unit and care assumed.

## 2018-07-16 NOTE — BRIEF OP NOTE
BRIEF OPERATIVE NOTE    Date of Procedure: 7/16/2018   Preoperative Diagnosis: MORBID OBESITY  Postoperative Diagnosis: MORBID OBESITY    Procedure(s):  LAPAROSCOPIC TAKE DOWN NISSEN FUNDOPLICATION , LAPAROSCOPIC HIATAL HERNIA REPAIR WITH MESH,  LAPAROSCOPIC GASTRIC BYPASS PARTIAL GASTRECTOMY AND EGD, EGD   ESOPHAGOGASTRODUODENOSCOPY (EGD)  Surgeon(s) and Role:     * Vick Waters MD - Primary         Surgical Assistant: Gwen Perry    Surgical Staff:  Circ-1: Layla Bonilla RN  Circ-Relief: Alex Welch RN  Physician Assistant: LUPILLO Braga  Scrub RN-1: Michael Meza RN  Scrub RN-Relief: Alex Welch RN  Event Time In   Incision Start 2971   Incision Close 1141     Anesthesia: General   Estimated Blood Loss: 75 cc  Specimens:   ID Type Source Tests Collected by Time Destination   1 : portion of small bowel and anastomotic rings and partial gastrectomy Fresh   Vick Waters MD 7/16/2018 1106 Pathology      Findings: Recurrent paraesophageal hernia with fundoplication herniated into mediastinum, reduced; PEH repaired via posterior cruroplasty (reinforced with BioA); takedown of fundoplication; 30 cc gastric pouch with 150 cm AC/AG Anna; no air leak or hemorrhage on endoscopy   Complications: none  Implants:   Implant Name Type Inv.  Item Serial No.  Lot No. LRB No. Used [facility]   N/A  [id number] N/A 4 Implanted   MESH SHT BIO ABSORBABLE [id number] --  - [id number]   MESH SHT BIO ABSORBABLE [id number] --  [id number]  [employer] [facility] NA N/A 1 Implanted

## 2018-07-16 NOTE — ROUTINE PROCESS
Bedside and Verbal shift change report given to Bruno Brittle (oncoming nurse) by Alex Oneal (offgoing nurse). Report included the following information SBAR, Kardex, Procedure Summary, MAR, Recent Results and Cardiac Rhythm NSR.

## 2018-07-16 NOTE — ROUTINE PROCESS
Primary Nurse Gregg Marcus RN and Ebenezer Lennon RN performed a dual skin assessment on this patient Impairment noted- see wound doc flow sheet  Serg score is 23    With surgical wound only.

## 2018-07-16 NOTE — OP NOTES
[address] [place] [address] REPORT    Parminder Mooney  MR#: [id number]  : 1970  ACCOUNT #: [de-identified]   DATE OF SERVICE: 2018    PRIMARY PREOPERATIVE DIAGNOSIS:  Morbid obesity. SECONDARY PREOPERATIVE DIAGNOSES:  1. Hypertension. 2.  Obstructive sleep apnea. 3.  Fine's esophagus. 4.  Recurrent paraesophageal hernia with GERD. POSTOPERATIVE DIAGNOSES:  Morbid obesity. PROCEDURES PERFORMED:  1. Laparoscopic Anna-en-Y gastric bypass with takedown of Nissen fundoplication (CPT 92378, use modifier 22). 2.  Laparoscopic paraesophageal hernia repair with mesh (CPT 77076). 3.  Intraoperative upper endoscopy. 4. Partial gastrectomy. SURGEON:  Ramila Shore MD    ASSISTANT:  Heriberto Molina PA-C    ANESTHESIA:  General endotracheal.    DRAINS:  19 mm Alonso drain. COUNTS:  Sponge count correct. Needle count correct. SPECIMENS REMOVED:  1. Segment of fundus. 2.  Segment of small intestine. ESTIMATED BLOOD LOSS:  75 mL. IMPLANTS:  7 cm x 10 cm Bio-A mesh. COMPLICATIONS:  None. INDICATIONS:  The patient is a 46-year-old -American male with a height of 69 inches, weight of 278 pounds, with a resultant body mass index of 41.05 KG per meter squared. He has a history of prior laparoscopic fundoplication in  in [place]. He has developed recurrent gastroesophageal reflux disease, in addition to morbid obesity with multiple comorbidities. All medical efforts of weight loss have been unsuccessful. During preoperative workup, he was found to have recurrent paraesophageal hernia, with the entire fundoplication herniated into the mediastinum. He has Fine's esophagus on endoscopy. After extensive preoperative counseling, patient education, and medical screening, it was felt he would be a good candidate for weight reduction surgery.   He presents to [facility] today for reduction and repair of the paraesophageal hernia, fundoplication takedown, with revision to gastric bypass. I have asked LUPILLO Basilio, to assist with the procedure, given the technical complexity of reoperative laparoscopic foregut surgery. She will run the laparoscope, assist in reduction of the hernia, assist in takedown of the fundoplication, and creation of the gastrojejunostomy. FINDINGS:  1. Recurrent type 3 paraesophageal hernia with fundoplication herniated 3 cm into the mediastinum, adherent to adjacent structures, reduced. 2.  Paraesophageal hernia repaired through posterior cruroplasty, reinforced with 7 cm x 10 cm segment of Bio-A mesh. 3.  Laparoscopic takedown of fundoplication with restoration of previous anatomy. 4.  A 30 mL gastric pouch with 445 cm antecolic, antegastric Anna limb. 5.  No intraluminal hemorrhage or insufflation air leak on upper endoscopy. 6.  Modifier 22 utilized secondary to need to take down the fundoplication to restore normal anatomy, which added more than 50% of expected time to complete laparoscopic gastric bypass. PROCEDURE:  The patient was identified as the correct patient in the preoperative holding area and informed consent was confirmed. After answering the patient's remaining questions and confirming  he had received 40 mg of Lovenox subcutaneously, he was taken to the operating room and placed on the operating room table in the supine position. Sequential compression devices were placed on both lower extremities. Following the uneventful initiation of general anesthesia, a Turk catheter was placed within his bladder, and he was carefully secured to the operating room table with a footboard and safety strap in place. All potential pressure points were padded with egg crate. His abdomen was prepped and draped in the usual sterile fashion. Final timeout was performed, and it was confirmed he had received intravenous antibiotics.   A 5 mm trocar was inserted through a small left subcostal skin incision using an Optiview technique. After confirming intraperitoneal location of the trocar tip, insufflation with carbon dioxide gas was initiated. Once adequate working space was then developed, a 5 mm, 30 degree laparoscope was inserted. No signs of trocar injury were present. The liver was noted to be enlarged. A 5 mm trocar was inserted 5 cm superior to the umbilicus using visual guidance with the laparoscope. The patient was placed in the steep reverse Trendelenburg position. Two right upper quadrant trocars, one 5 mm and another 12 mm, were inserted using visual guidance with the laparoscope. A left-sided 15 mm trocar was inserted using identical technique. The Ecommoen liver retractor was inserted through a small subxiphoid incision. With the liver retracted anteriorly and cephalad, the esophageal hiatus was visualized. A large recurrent paraesophageal hernia was identified, with the upper stomach and gastroesophageal junction herniated into the mediastinum. The pars flaccida portion of the gastrohepatic ligament was incised, allowing atraumatic entry into the lesser sac. The gastrohepatic ligament was serially ligated and divided using the Harmonic scalpel. This was carried to the base of the right carol of the diaphragm. A plane was developed between the medial border of the right carol of the diaphragm and hernia sac. This plane was developed in a cephalad and anterior direction, reducing hernia sac from the pleura along the right. A small opening in the pleura occurred without desaturation. This plane was developed anteriorly, freeing the anterior aspect of the hernia sac from the area below the central tendon. This plane was then carried to the left carol of the diaphragm, with development of a plane between hernia sac and the left carol of the diaphragm and left pleura using identical technique, proceeding anterior to posterior.   Likewise, a small tear in the pleura occurred without detriment. Posterior dissection was then performed, allowing identification of the joining of the right and left crura. A plane was developed anterior along the left carol of the diaphragm. This allowed placement of a Penrose drain to allow atraumatic retraction of the upper stomach. The wrap could be identified, still within the mediastinum. Circumferential dissection was then performed, freeing hernia sac from additional segments of the pleura, the pericardium, and the aorta posteriorly. During this dissection, the patient developed transient hypotension, which was managed through desufflation and returned to the supine position. Following administration of normal blood pressure, he was returned to the reverse Trendelenburg position and pneumoperitoneum was reestablished. With continued dissection, the wrap was reduced into the abdominal space. Plans were then made for takedown of the fundoplication. A plane was developed along the greater curve of the stomach in the left upper quadrant using the Harmonic scalpel, with takedown of the gastrocolic ligament and scar tissue from prior Nissen fundoplication. As the   lesser sac was entered, this plane was developed to the base of the left carol of the diaphragm, also utilizing the Harmonic scalpel. Anteriorly, with the wrap now reduced into the abdominal space, with a 2.5 cm segment of intra-abdominal esophagus, plans were made for fundoplication takedown. A plane was developed between the area of the wrap and the underlying gastroesophageal junction using careful blunt dissection. As this plane was developed in an inferior to superior direction, the wrap was taken down with division of suture material using sharp dissection.   Once the wrap was opened, the wrap was freed from the lesser curve aspect of the gastroesophageal junction, and significant adhesions were identified requiring extensive fraying of the fundoplication from the lesser curve aspect of the upper stomach. With continued dissection, the segment of the fundus was reduced in the retroesophageal fashion back into the left upper quadrant. Greater than 50% of normal time was required for Nissen fundoplication takedown to allow gastric bypass to proceed. With the fundoplication taken down, plans were made for paraesophageal hernia repair. The paraesophageal hernia was repaired with multiple figure-of-eight 0 Surgidek sutures through a posterior cruroplasty. Given the recurrent nature of his hernia, this primary repair was reinforced with a 7 cm x 10 cm segment of Bio-A mesh, secured to the diaphragm using 0 Surgidac sutures. Plans were made for pouch construction. A site 4 cm distal to the gastroesophageal junction was chosen. The gastrohepatic ligament was incised at this location using the Harmonic scalpel. Bleeding points within the gastrohepatic ligament were controlled using the Harmonic scalpel. A 30 mL gastric pouch was constructed with multiple firings of a linear stapler using purple load cartridges with SeamGuard reinforcement material.  The calibration tube was used to size pouch construction. Following pouch construction, the calibration tube was removed. The upper segment of the fundus, which had been part of the fundoplication, appeared dusky, and this was amputated with 2 firings of a purple load linear staple with reinforcement material.  It was placed in the upper abdomen with plans for later retrieval.  The patient was returned to the supine position. The omentum was retracted into the upper abdomen. The omentum was serially ligated and divided using the Harmonic scalpel from its free edge to the antimesenteric border of the transverse colon. With the transverse colon elevated cephalad and anteriorly, the ligament of Treitz was identified. 50 cm of small bowel were measured distal to this landmark. The jejunum was divided at this point using a tan load linear stapler. The distal edge of the bowel was marked with a 0 Surgidac suture. The small bowel mesentery was mobilized using the Harmonic scalpel. Once adequate mobility of the alimentary limb had been achieved, an additional 160 cm of bowel were measured distal to this bowel transection site. The segment of bowel was brought alongside the biliopancreatic limb in side-to-side fashion. This orientation was maintained with 0 Surgidac stay sutures. The Harmonic scalpel was used to make an opening in each segment of bowel, through which a 60 mm tan load linear stapler was carefully inserted into each segment of the bowel. After confirming correct insertion and orientation of the stapler, it was fired and removed. The staple line was noted to be hemostatic. The remaining opening in the bowel was closed with a running 2-0 Polysorb suture. A tension relieving antiobstruction suture was placed at the distal edge of the staple line. The mesenteric defect was closed with a running 0 Surgidac suture. The wound protector was then applied to the 15 mm fascial defect. A 25 EEA anvil attached to a nasogastric tube was passed transorally, such that the tube was positioned 1 cm anterior to the transverse pouch staple line. Electrocautery was   used to make an opening at the site, and the tube was delivered through the opening into the abdominal space. The tube was used to position the anvil within the gastric pouch with anesthesia staff providing jaw thrust.  The tube was  from the anvil and removed from the patient's body. The Anna limb was delivered into the upper abdomen with care to avoid twisting of the bowel or its blood supply. The Harmonic scalpel was used to make an opening in the Anna limb adjacent to the staple line, through which a 25 EEA stapler was passed intraluminally to a healthy site of bowel.   The stapler spike was deployed through the antimesenteric border of the Anna limb and mated with the anvil within the gastric pouch. The staple was slowly and carefully closed with care to avoid twisting of the pouch or incorporation of extraneous tissue. After confirming correct staple closure, it was fired and removed. Both proximal and distal donuts were noted to be intact. The open, redundant segment of the Anna limb was amputated with a firing of a tan load linear stapler  once the blood supply had been controlled using the Harmonic scalpel. This segment of bowel, along with the segment of fundus, was placed within a retrieval bag and removed from the patient's body. Once pneumoperitoneum had been reestablished, tension relieving sutures were placed between the antimesenteric border of the Anna limb, the gastric pouch, and the excluded stomach. An intestinal clamp was placed on the Anna limb distal to the anastomosis. The patient was placed in the supine position. Sterile saline was instilled into the upper abdomen. Intraoperative upper endoscopy was performed. The gastroscope was passed transorally through the gastroesophageal junction, and into the gastric pouch. With insufflation using the gastroscope, adequate pouch and Anna limb distention were noted. No insufflation air leak occurred. No intraluminal hemorrhage was identified. The anastomosis was hemostatic and patent. The bowel was decompressed and the gastroscope was removed. Sterile saline was evacuated from the upper abdomen. Ochoa's space was closed with a running 0 Surgidac suture. A 19 mm Alonso drain was inserted into the abdominal space. It was allowed to lie adjacent to the anastomosis and brought out through the left subcostal 5 mm trocar wound. It was secured to the skin with a 2-0 nylon suture. After confirming adequate hemostasis, the Josue liver retractor was removed, and my assistant changed gloves. This was performed after removal of the wound protector.   The 15 mm fascial defect was closed with a series of 0 Vicryl sutures using a laparoscopic suture passer. Pneumoperitoneum was released and all trocars were removed. All wounds were infiltrated with 0.5% Marcaine without epinephrine. All skin edges were reapproximated with a combination of subcuticular 4-0 Monocryl suture and Dermabond. The patient tolerated the procedure well. He was extubated in the recovery area following transfer to the PACU, given history of sleep apnea. The attending surgeon, Dr. Lizabeth Webb, was scrubbed and present for the entire procedure.       Oxana Solano MD       [address] [place] [address] / [facility]  D: 07/16/2018 16:29     T: 07/16/2018 17:32  JOB #: [id number]

## 2018-07-16 NOTE — IP AVS SNAPSHOT
[address] 
[phone] Patient: Ebenezer Moeller MRN: [id number] About your hospitalization You were admitted on:  July 16, 2018 You last received care in the:  [facility] 4 SURG/BARIATRICS You were discharged on:  July 18, 2018 Why you were hospitalized Your primary diagnosis was:  Not on File Your diagnoses also included: Morbid Obesity (Hcc) Follow-up Information Follow up With Details Comments Contact Info Austyn Mayen MD   Patient can only remember the practice name and not the physician Your Scheduled Appointments Monday July 30, 2018  9:20 AM EDT  
POST OP 10 MIN with Serge Mcdonald NP  
[facility] 22 820 ([facility]) [address] [facility] [address] [facility] 7 [phone]  
[phone] Monday August 13, 2018  9:20 AM EDT  
POST OP 10 MIN with Serge Mcdonald NP  
[facility] 22 826 ([facility]) [address] [facility] [address] [facility] 7 [phone]  
[phone] Monday August 27, 2018  9:20 AM EDT  
POST OP 10 MIN with Serge Mcdonald NP  
[facility] 22 846 ([facility]) [address] [facility] [address] [place] [phone]  
[phone] Discharge Orders None A check pawel indicates which time of day the medication should be taken. My Medications START taking these medications Instructions Each Dose to Equal  
 Morning Noon Evening Bedtime HYDROmorphone 2 mg tablet Commonly known as:  DILAUDID Your last dose was: Your next dose is: Take 1-2 Tabs by mouth every four (4) hours as needed. Max Daily Amount: 24 mg.  
 2-4 mg  
    
   
   
   
  
 potassium chloride SA 15 mEq tablet Commonly known as:  KLOR-CON M15 Your last dose was: Your next dose is: Take 1 Tab by mouth two (2) times a day for 2 days. 15 mEq CONTINUE taking these medications Instructions Each Dose to Equal  
 Morning Noon Evening Bedtime  
 allopurinol 300 mg tablet Commonly known as:  Dionicia Dakins Your last dose was: Your next dose is: Take 300 mg by mouth. 300 mg  
    
   
   
   
  
 amLODIPine 10 mg tablet Commonly known as:  Daren Mckeon Your last dose was: Your next dose is: Take 10 mg by mouth daily. 10 mg Dexlansoprazole 60 mg Cpdb Your last dose was: Your next dose is: Take 60 mg by mouth. 60 mg  
    
   
   
   
  
 ergocalciferol 50,000 unit capsule Commonly known as:  ERGOCALCIFEROL Your last dose was: Your next dose is: Take 1 Cap by mouth every seven (7) days. [address] Units  
    
   
   
   
  
 labetalol 100 mg tablet Commonly known as:  Chyrel Royal Your last dose was: Your next dose is: Take 300 mg by mouth two (2) times a day. 300 mg  
    
   
   
   
  
 lisinopril 40 mg tablet Commonly known as:  Marnie Nascimento Your last dose was: Your next dose is: Take 40 mg by mouth daily. 40 mg  
    
   
   
   
  
 ondansetron 4 mg disintegrating tablet Commonly known as:  ZOFRAN ODT Your last dose was: Your next dose is: Take 1 Tab by mouth every eight (8) hours as needed for Nausea. 4 mg  
    
   
   
   
  
 sertraline 100 mg tablet Commonly known as:  ZOLOFT Your last dose was: Your next dose is: Take 100 mg by mouth. 100 mg  
    
   
   
   
  
 simvastatin 10 mg tablet Commonly known as:  ZOCOR Your last dose was: Your next dose is: Take 10 mg by mouth. 10 mg  
    
   
   
   
  
 traZODone 100 mg tablet Commonly known as:  Chester Bajwa  
   
 Your last dose was: Your next dose is: Take 100 mg by mouth. 100 mg  
    
   
   
   
  
  
STOP taking these medications   
 hydroCHLOROthiazide 25 mg tablet Commonly known as:  HYDRODIURIL Where to Get Your Medications Information on where to get these meds will be given to you by the nurse or doctor. ! Ask your nurse or doctor about these medications HYDROmorphone 2 mg tablet  
 potassium chloride SA 15 mEq tablet Opioid Education Prescription Opioids: What You Need to Know: 
 
Prescription opioids can be used to help relieve moderate-to-severe pain and are often prescribed following a surgery or injury, or for certain health conditions. These medications can be an important part of treatment but also come with serious risks. Opioids are strong pain medicines. Examples include hydrocodone, oxycodone, fentanyl, and morphine. Heroin is an example of an illegal opioid. It is important to work with your health care provider to make sure you are getting the safest, most effective care. WHAT ARE THE RISKS AND SIDE EFFECTS OF OPIOID USE? Prescription opioids carry serious risks of addiction and overdose, especially with prolonged use. An opioid overdose, often marked by slow breathing, can cause sudden death. The use of prescription opioids can have a number of side effects as well, even when taken as directed. · Tolerance-meaning you might need to take more of a medication for the same pain relief · Physical dependence-meaning you have symptoms of withdrawal when the medication is stopped. Withdrawal symptoms can include nausea, sweating, chills, diarrhea, stomach cramps, and muscle aches. Withdrawal can last up to several weeks, depending on which drug you took and how long you took it. · Increased sensitivity to pain · Constipation · Nausea, vomiting, and dry mouth · Sleepiness and dizziness · Confusion · Depression · Low levels of testosterone that can result in lower sex drive, energy, and strength · Itching and sweating RISKS ARE GREATER WITH:      
· History of drug misuse, substance use disorder, or overdose · Mental health conditions (such as depression or anxiety) · Sleep apnea · Older age (72 years or older) · Pregnancy Avoid alcohol while taking prescription opioids. Also, unless specifically advised by your health care provider, medications to avoid include: · Benzodiazepines (such as Xanax or Valium) · Muscle relaxants (such as Soma or Flexeril) · Hypnotics (such as Ambien or Lunesta) · Other prescription opioids KNOW YOUR OPTIONS Talk to your health care provider about ways to manage your pain that don't involve prescription opioids. Some of these options may actually work better and have fewer risks and side effects. Options may include: 
· Pain relievers such as acetaminophen, ibuprofen, and naproxen · Some medications that are also used for depression or seizures · Physical therapy and exercise · Counseling to help patients learn how to cope better with triggers of pain and stress. · Application of heat or cold compress · Massage therapy · Relaxation techniques Be Informed Make sure you know the name of your medication, how much and how often to take it, and its potential risks & side effects. IF YOU ARE PRESCRIBED OPIOIDS FOR PAIN: 
· Never take opioids in greater amounts or more often than prescribed. Remember the goal is not to be pain-free but to manage your pain at a tolerable level. · Follow up with your primary care provider to: · Work together to create a plan on how to manage your pain. · Talk about ways to help manage your pain that don't involve prescription opioids. · Talk about any and all concerns and side effects. · Help prevent misuse and abuse. · Never sell or share prescription opioids · Help prevent misuse and abuse. · Store prescription opioids in a secure place and out of reach of others (this may include visitors, children, friends, and family). · Safely dispose of unused/unwanted prescription opioids: Find your community drug take-back program or your pharmacy mail-back program, or flush them down the toilet, following guidance from the Food and Drug Administration (www.fda.gov/Drugs/ResourcesForYou). · Visit www.cdc.gov/drugoverdose to learn about the risks of opioid abuse and overdose. · If you believe you may be struggling with addiction, tell your health care provider and ask for guidance or call [facility] at 4-159-107-VBOU. Discharge Instructions Gastric Bypass Patient Discharge Instructions [facility] 
[phone] 1. Activities: You may be active walking, stair climbing, and doing light weight activities with one to two-pound weights, sitting in a chair using different arm motions. Major restrictions include driving until your first office visit and lifting anything heavier than ten pounds. It is very important that you walk frequently. In addition to walking, you should continue to use your incentive spirometer (breathing exercises) throughout the day. 2. Caring for vour incision (s}: Your surgical incision(s) will be covered with Derma bond (super glue). You may shower as desired and simply pat dry the area over the incision(s). There may occasionally be seepage of yellowish to yellowish-maroon dissolved fat from your wound, which is of no major concern as long as the wound is not red, hardened in the area of the drainage, or if the drainage has a foul smell. If there are any questions, call our office for further instructions. If there is this type of dissolved fat drainage,  the shower and gently express the fluid from your wound.  Then keep gauze pads over the area to protect both the wound and your clothes. 3. When to call the office immediately: 
 
 
*Chest pain (not associated with eating/drinking) *Shortness of breath (more than normal) *Sudden pain and/or redness in calf *Fever greater than 101F 
*Persistent nausea and/or vomiting (unable to keep down any liquids) *Bleeding from incision(s) *Severe abdominal pain *Any other concerning symptoms 4. Diet: There are three main priorities as far as your diet is concerned--- 
 
a. Clear Liquids-drink from 1 oz. cups or standard shot glass. These liquids are non-carbonated, no added sugar, and not irritating to your stomach. They may include water, tea, coffee\" 100% no added sugar juices (avoid citrus) , clear broth, blenderized clear soups such as Likez' Healthy Request Chicken Noodle and Chicken & Rice, Sugar-free products including popsicles, Mark-Aid, and Crystal Lite. b. Vitamins: Multi-vitamin with iron in the morning and evening, making sure 
it's not the first thing or the last thing taken. The other Vitamin B-12 and 
vitamin A & D capsule may be taken once during the day anytime that you 
can make a routine of it. 
 
c. Protein Intake: During your initial two-three weeks when your body is switching from burning glucose to directly burning fat, there is an attempt by your body to use protein as a fuel. To protect that protein, we like you to exercise as listed above, and to try to take in 50-60 grams of protein per day. This can be done with four 8 oz. serving of skim milk and Low Carb [place] Instant Breakfast. Each 8 oz. should be considered a meal-breakfast, lunch, or supper, and during this mealtime it should be the sole ingested substance. Stop your clear liquids for 20 minutes before your start on the instant breakfast, which is sipped 1 oz. at a time. You may also try the following substitute for [place] Instant Breakfast: skim milk-fat free powdered milk + Egg Beaters + flavor extract.  If desired, ice may be added and blenderized in the . If the milk upsets your stomach, you may purchase Lactaid tablets from any drug store. Lactaid skim milk may be purchased at most major supermarkets. Another alternative is Boost Diabetic, which is Lactose-free and ready to drink. There are many protein supplements on the market. Whey and Soy based protein powders/drinks are acceptable. If you have any questions about specific products please call the office. If you are having difficulty with your diet, please call the office. 5. Medications: Take no more than 2 pills at a time. Wait 20 minutes between pills. a. Vitamins as listed above---multi-vitamin with iron twice a day, B-12 once a day, vitamin A& D once a day. b. Acid reducing medicineWill be prescribed by your surgeon at discharge. c. Mylanta Plusone to two teaspoons as needed for belching or gas (other gas relieving medicines are also acceptable Beano, GasX, etc). d. Bowel Regulationmild laxatives are permissible such as Milk of Magnesia, Dulcolax (either by mouth or suppository). If you are accustomed to using a [id number] laxative not mentioned, you may continue to use it. Fibercon tablets or Benefiber may be taken as a fiber supplement to regulate bowel movements. Fibercon tablets should be broken in half and taken in half and taken one half in the morning and one half in the evening. Benefiber (1 tsp.) can be added to your protein drink(s). It has no taste or added thickness. Imodium AD may be taken as needed for diarrhea. 
 
e. Pain medication-one to two every four hours as needed for pain control. If pain is mild, try extra-strength Tylenol first. 
 
f. Do not take any pain or arthritis medication such as Aspirin, Advil, Aleve, Naprosyn, or any other nonsteroidal anti-inflammatory medication unless approved by your surgeon. A Tylenol product is OK. g. Preadmission medications may be resumed, but this should be discussed without your doctor before your discharge from the hospital. 
 
Future Appointments Date Time Provider Telly Santana 7/30/2018 9:20 AM JOSEPH Cantrell SARITA SCHED  
8/13/2018 9:20 AM JOSEPH Cantrell SARITA SCHED  
8/27/2018 9:20 AM JOSEPH Cantrell Introducing [facility] [place] [facility]! Romayne Duster introduces Canva patient portal. Now you can access parts of your medical record, email your doctor's office, and request medication refills online. 1. In your internet browser, go to [email or web address] 2. Click on the First Time User? Click Here link in the Sign In box. You will see the New Member Sign Up page. 3. Enter your Canva Access Code exactly as it appears below. You will not need to use this code after youve completed the sign-up process. If you do not sign up before the expiration date, you must request a new code. · Canva Access Code: [email or web address] Expires: 10/11/2018  3:00 PM 
 
4. Enter the last four digits of your Social Security Number (xxxx) and Date of Birth (mm/dd/yyyy) as indicated and click Submit. You will be taken to the next sign-up page. 5. Create a Canva ID. This will be your Canva login ID and cannot be changed, so think of one that is secure and easy to remember. 6. Create a Canva password. You can change your password at any time. 7. Enter your Password Reset Question and Answer. This can be used at a later time if you forget your password. 8. Enter your e-mail address. You will receive e-mail notification when new information is available in [address] 9. Click Sign Up. You can now view and download portions of your medical record. 10. Click the Download Summary menu link to download a portable copy of your medical information.  
 
If you have questions, please visit the Frequently Asked Questions section of the [email or web address]. Remember, MyChart is NOT to be used for urgent needs. For medical emergencies, dial 911. Now available from your iPhone and Android! Introducing Amauri Riddle As a [facility] patient, I wanted to make you aware of our electronic visit tool called Amauri Riddle. Williams Contentful/7 allows you to connect within minutes with a medical provider 24 hours a day, seven days a week via a mobile device or tablet or logging into a secure website from your computer. You can access Amauri Riddle from anywhere in the [place]. A virtual visit might be right for you when you have a simple condition and feel like you just dont want to get out of bed, or cant get away from work for an appointment, when your regular [facility] provider is not available (evenings, weekends or holidays), or when youre out of town and need minor care. Electronic visits cost only $49 and if the [facility] 24/7 provider determines a prescription is needed to treat your condition, one can be electronically transmitted to a nearby pharmacy*. Please take a moment to enroll today if you have not already done so. The enrollment process is free and takes just a few minutes. To enroll, please download the [facility] 24/GlycoPure hiram to your tablet or phone, or visit [email or web address] to enroll on your computer. And, as an [address] [place] [address] patient with a Grokker account, the results of your visits will be scanned into your electronic medical record and your primary care provider will be able to view the scanned results. We urge you to continue to see your regular [facility] provider for your ongoing medical care. And while your primary care provider may not be the one available when you seek a Amauri Riddle virtual visit, the peace of mind you get from getting a real diagnosis real time can be priceless. For more information on Amauri Riddle, view our Frequently Asked Questions (FAQs) at [email or web address]. Sincerely, 
 
Delvis Ball MD 
Chief Medical Officer [address] *:  certain medications cannot be prescribed via Amauri Riddle Providers Seen During Your Hospitalization Provider Specialty Primary office phone Mary Grace Ji MD General Surgery [phone] Your Primary Care Physician (PCP) Primary Care Physician Office Phone Office Fax OTHER, PHYS ** None ** ** None ** You are allergic to the following No active allergies Recent Documentation Height Weight BMI Smoking Status 1.753 m 129.6 kg 42.19 kg/m2 Never Smoker Emergency Contacts Name Discharge Info Relation Home Work Mobile Saundra Walsh \"Shoshana\" DISCHARGE CAREGIVER [3] Spouse [3] [phone] Patient Belongings The following personal items are in your possession at time of discharge: 
  Dental Appliances: None  Visual Aid: Glasses Discharge Instructions Attachments/References HYDROMORPHONE (BY MOUTH) (ENGLISH) MEFS - POTASSIUM SUPPLEMENT (KLOR-CON, KLOR-CON 10, K-TAB, K-VESCENT) - (BY MOUTH) (ENGLISH) Patient Handouts Hydromorphone (By mouth) Hydromorphone ([id number]) Treats moderate to severe pain. This medicine is a narcotic pain reliever. Brand Name(s): Dilaudid, Exalgo There may be other brand names for this medicine. When This Medicine Should Not Be Used: This medicine is not right for everyone. Do not use it if you had an allergic reaction to hydromorphone or sulfites, or if you have severe lung or breathing problems, or stomach or bowel blockage (including paralytic ileus). How to Use This Medicine:  
Long Acting Capsule, Liquid, Tablet, Long Acting Tablet · Take your medicine as directed.  Your dose may need to be changed several times to find what works best for you. An overdose can be dangerous. Follow directions carefully so you do not get too much medicine at one time. · Oral liquid: Measure the oral liquid medicine with a marked measuring spoon, oral syringe, or medicine cup. If you get any of the oral liquid on your skin, rinse it with cool water right away. · Swallow the extended-release capsule whole. Do not crush, break, or chew it. · Swallow the extended-release tablet whole. Do not crush, break, or chew it. · If you take the extended-release tablet, part of the tablet may pass into your stools. This is normal and is nothing to worry about. · Hydromorphone extended-release capsules or tablets work differently than regular hydromorphone tablets, even at the same dose. Do not switch from one form to another unless your doctor tells you to. · This medicine should come with a Medication Guide. Ask your pharmacist for a copy if you do not have one. · Missed dose:  
¨ Extended-release capsules or tablets: If you miss a dose, skip the missed dose and take your next dose at the usual time the next day. Do not double doses. ¨ Oral liquid: Take a dose as soon as you remember. If it is almost time for your next dose, wait until then and take a regular dose. Do not take extra medicine to make up for a missed dose. · Store the medicine in a closed container at room temperature, away from heat, moisture, and direct light. Store the medicine in a safe and secure place. Do not throw unused medicine in the trash. Ask your pharmacist about the best way to dispose of medicine you do not use. Drugs and Foods to Avoid: Ask your doctor or pharmacist before using any other medicine, including over-the-counter medicines, vitamins, and herbal products. · Do not use this medicine if you are using or have used an MAO inhibitor within the past 14 days. · Some medicines can affect how hydromorphone works.  Tell your doctor if you are using any of the following: ¨ Blood pressure medicine ¨ Diuretic (water pill) ¨ Medicine to treat depression ¨ Phenothiazine medicine · Do not drink alcohol while you are using this medicine. · Tell your doctor if you use anything else that makes you sleepy. Some examples are allergy medicine, narcotic pain medicine, and alcohol. Tell your doctor if you are also using buprenorphine, butorphanol, nalbuphine, pentazocine, or a muscle relaxer. Warnings While Using This Medicine: · Tell your doctor if you are pregnant or breastfeeding, or if you have kidney disease, liver disease, lung disease or breathing problems (such as asthma or COPD), an underactive thyroid, adrenal problems, cystic fibrosis, pancreas problems, gallbladder problems, an enlarged prostate, trouble urinating, or stomach or bowel problems. Tell your doctor if you have a history of head injury, brain tumor, seizures, depression, or alcohol or drug abuse. · This medicine may cause the following problems: 
¨ High risk of overdose, which can lead to death ¨ Respiratory depression (serious breathing problem that can be life-threatening) ¨ Serotonin syndrome, when used with certain medicines · This medicine can be habit-forming. Do not use more than your prescribed dose. Call your doctor if you think your medicine is not working. · Do not stop using this medicine suddenly. Your doctor will need to slowly decrease your dose before you stop it completely. · This medicine may make you dizzy, drowsy, or lightheaded. Do not drive or do anything else that could be dangerous until you know how this medicine affects you. Sit or lie down if you feel dizzy. Stand up carefully. · This medicine could cause infertility. Talk with your doctor before using this medicine if you plan to have children. · This medicine may cause constipation, especially with long-term use.  Ask your doctor if you should use a laxative to prevent and treat constipation. · Keep all medicine out of the reach of children. Never share your medicine with anyone. Possible Side Effects While Using This Medicine:  
Call your doctor right away if you notice any of these side effects: · Allergic reaction: Itching or hives, swelling in your face or hands, swelling or tingling in your mouth or throat, chest tightness, trouble breathing · Anxiety, restlessness, fast heartbeat, fever, sweating, muscle spasms, twitching, nausea, vomiting, diarrhea, seeing or hearing things that are not there · Blue lips, fingernails, or skin · Extreme dizziness or weakness, shallow breathing, slow or uneven heartbeat, sweating, cold or clammy skin, seizures · Severe confusion, lightheadedness, dizziness, fainting · Severe constipation, stomach pain, or vomiting · Trouble breathing or slow breathing If you notice these less serious side effects, talk with your doctor: · Mild constipation, nausea, or vomiting · Mild sleepiness or tiredness If you notice other side effects that you think are caused by this medicine, tell your doctor. Call your doctor for medical advice about side effects. You may report side effects to FDA at 3-893-FDA-6421 © 2017 [facility] Information is for End User's use only and may not be sold, redistributed or otherwise used for commercial purposes. The above information is an  only. It is not intended as medical advice for individual conditions or treatments. Talk to your doctor, nurse or pharmacist before following any medical regimen to see if it is safe and effective for you. Potassium Supplement (Klor-Con, Klor-Con 10, K-Tab, K-Vescent) - (By mouth) Why this medicine is used:  
Treats a low potassium level. Contact a nurse or doctor right away if you have: · Uneven heartbeat, trouble breathing · Confusion, weakness, numbness in your hands, feet, or lips · Stomach pain, nausea, vomiting, diarrhea, bloody or black, tarry stools © 2017 [address] Information is for End User's use only and may not be sold, redistributed or otherwise used for commercial purposes. Please provide this summary of care documentation to your next provider. Signatures-by signing, you are acknowledging that this After Visit Summary has been reviewed with you and you have received a copy. Patient Signature:  ____________________________________________________________ Date:  ____________________________________________________________  
  
[employer] Provider Signature:  ____________________________________________________________ Date:  ____________________________________________________________

## 2018-07-17 ENCOUNTER — APPOINTMENT (OUTPATIENT)
Dept: GENERAL RADIOLOGY | Age: 48
DRG: 620 | End: 2018-07-17
Attending: SURGERY
Payer: COMMERCIAL

## 2018-07-17 LAB
ANION GAP SERPL CALC-SCNC: 11 MMOL/L (ref 5–15)
BASOPHILS # BLD: 0 K/UL (ref 0–0.1)
BASOPHILS NFR BLD: 0 % (ref 0–1)
BUN SERPL-MCNC: 12 MG/DL (ref 6–20)
BUN/CREAT SERPL: 11 (ref 12–20)
CALCIUM SERPL-MCNC: 7.8 MG/DL (ref 8.5–10.1)
CHLORIDE SERPL-SCNC: 105 MMOL/L (ref 97–108)
CO2 SERPL-SCNC: 26 MMOL/L (ref 21–32)
CREAT SERPL-MCNC: 1.13 MG/DL (ref 0.7–1.3)
DIFFERENTIAL METHOD BLD: ABNORMAL
EOSINOPHIL # BLD: 0 K/UL (ref 0–0.4)
EOSINOPHIL NFR BLD: 0 % (ref 0–7)
ERYTHROCYTE [DISTWIDTH] IN BLOOD BY AUTOMATED COUNT: 14 % (ref 11.5–14.5)
GLUCOSE SERPL-MCNC: 94 MG/DL (ref 65–100)
HCT VFR BLD AUTO: 35.9 % (ref 36.6–50.3)
HGB BLD-MCNC: 12.3 G/DL (ref 12.1–17)
IMM GRANULOCYTES # BLD: 0.1 K/UL (ref 0–0.04)
IMM GRANULOCYTES NFR BLD AUTO: 0 % (ref 0–0.5)
LYMPHOCYTES # BLD: 1.9 K/UL (ref 0.8–3.5)
LYMPHOCYTES NFR BLD: 15 % (ref 12–49)
MCH RBC QN AUTO: 30.2 PG (ref 26–34)
MCHC RBC AUTO-ENTMCNC: 34.3 G/DL (ref 30–36.5)
MCV RBC AUTO: 88.2 FL (ref 80–99)
MONOCYTES # BLD: 0.9 K/UL (ref 0–1)
MONOCYTES NFR BLD: 7 % (ref 5–13)
NEUTS SEG # BLD: 10.4 K/UL (ref 1.8–8)
NEUTS SEG NFR BLD: 78 % (ref 32–75)
NRBC # BLD: 0 K/UL (ref 0–0.01)
NRBC BLD-RTO: 0 PER 100 WBC
PLATELET # BLD AUTO: 225 K/UL (ref 150–400)
PMV BLD AUTO: 11.4 FL (ref 8.9–12.9)
POTASSIUM SERPL-SCNC: 2.8 MMOL/L (ref 3.5–5.1)
RBC # BLD AUTO: 4.07 M/UL (ref 4.1–5.7)
SODIUM SERPL-SCNC: 142 MMOL/L (ref 136–145)
WBC # BLD AUTO: 13.4 K/UL (ref 4.1–11.1)

## 2018-07-17 PROCEDURE — 74011250637 HC RX REV CODE- 250/637: Performed by: SURGERY

## 2018-07-17 PROCEDURE — 85025 COMPLETE CBC W/AUTO DIFF WBC: CPT | Performed by: SURGERY

## 2018-07-17 PROCEDURE — 80048 BASIC METABOLIC PNL TOTAL CA: CPT | Performed by: SURGERY

## 2018-07-17 PROCEDURE — 74011250636 HC RX REV CODE- 250/636: Performed by: SURGERY

## 2018-07-17 PROCEDURE — 74011636320 HC RX REV CODE- 636/320: Performed by: RADIOLOGY

## 2018-07-17 PROCEDURE — 65660000000 HC RM CCU STEPDOWN

## 2018-07-17 PROCEDURE — 74011250636 HC RX REV CODE- 250/636: Performed by: NURSE PRACTITIONER

## 2018-07-17 PROCEDURE — 74241 XR UPPER GI W KUB/ BA SWALLOW: CPT

## 2018-07-17 PROCEDURE — 36415 COLL VENOUS BLD VENIPUNCTURE: CPT | Performed by: SURGERY

## 2018-07-17 PROCEDURE — 74011000250 HC RX REV CODE- 250: Performed by: SURGERY

## 2018-07-17 RX ORDER — LABETALOL 100 MG/1
300 TABLET, FILM COATED ORAL 2 TIMES DAILY
Status: DISCONTINUED | OUTPATIENT
Start: 2018-07-17 | End: 2018-07-18 | Stop reason: HOSPADM

## 2018-07-17 RX ORDER — AMLODIPINE BESYLATE 10 MG/1
10 TABLET ORAL DAILY
COMMUNITY

## 2018-07-17 RX ORDER — HYDROMORPHONE HYDROCHLORIDE 2 MG/1
2-4 TABLET ORAL
Status: DISCONTINUED | OUTPATIENT
Start: 2018-07-17 | End: 2018-07-18 | Stop reason: HOSPADM

## 2018-07-17 RX ORDER — LISINOPRIL 40 MG/1
40 TABLET ORAL DAILY
COMMUNITY

## 2018-07-17 RX ORDER — SIMVASTATIN 10 MG/1
10 TABLET, FILM COATED ORAL
Status: DISCONTINUED | OUTPATIENT
Start: 2018-07-17 | End: 2018-07-18 | Stop reason: HOSPADM

## 2018-07-17 RX ORDER — AMLODIPINE BESYLATE 5 MG/1
10 TABLET ORAL DAILY
Status: DISCONTINUED | OUTPATIENT
Start: 2018-07-17 | End: 2018-07-18 | Stop reason: HOSPADM

## 2018-07-17 RX ORDER — TRAZODONE HYDROCHLORIDE 100 MG/1
100 TABLET ORAL
Status: DISCONTINUED | OUTPATIENT
Start: 2018-07-17 | End: 2018-07-18 | Stop reason: HOSPADM

## 2018-07-17 RX ORDER — LISINOPRIL 20 MG/1
40 TABLET ORAL DAILY
Status: DISCONTINUED | OUTPATIENT
Start: 2018-07-17 | End: 2018-07-18 | Stop reason: HOSPADM

## 2018-07-17 RX ORDER — LISINOPRIL 20 MG/1
40 TABLET ORAL DAILY
Status: DISCONTINUED | OUTPATIENT
Start: 2018-07-17 | End: 2018-07-17

## 2018-07-17 RX ADMIN — MORPHINE SULFATE 4 MG: 2 INJECTION, SOLUTION INTRAMUSCULAR; INTRAVENOUS at 01:44

## 2018-07-17 RX ADMIN — KETOROLAC TROMETHAMINE 15 MG: 30 INJECTION, SOLUTION INTRAMUSCULAR; INTRAVENOUS at 01:44

## 2018-07-17 RX ADMIN — KETOROLAC TROMETHAMINE 15 MG: 30 INJECTION, SOLUTION INTRAMUSCULAR; INTRAVENOUS at 07:36

## 2018-07-17 RX ADMIN — LABETALOL HYDROCHLORIDE 300 MG: 100 TABLET, FILM COATED ORAL at 21:10

## 2018-07-17 RX ADMIN — SIMVASTATIN 10 MG: 10 TABLET, FILM COATED ORAL at 21:10

## 2018-07-17 RX ADMIN — METOPROLOL TARTRATE 5 MG: 5 INJECTION, SOLUTION INTRAVENOUS at 01:44

## 2018-07-17 RX ADMIN — SODIUM CHLORIDE, SODIUM LACTATE, POTASSIUM CHLORIDE, AND CALCIUM CHLORIDE 125 ML/HR: 600; 310; 30; 20 INJECTION, SOLUTION INTRAVENOUS at 07:33

## 2018-07-17 RX ADMIN — IOHEXOL 50 ML: 350 INJECTION, SOLUTION INTRAVENOUS at 08:06

## 2018-07-17 RX ADMIN — TRAZODONE HYDROCHLORIDE 100 MG: 100 TABLET ORAL at 21:10

## 2018-07-17 RX ADMIN — ACETAMINOPHEN 1000 MG: 10 INJECTION, SOLUTION INTRAVENOUS at 08:59

## 2018-07-17 RX ADMIN — LABETALOL HYDROCHLORIDE 300 MG: 100 TABLET, FILM COATED ORAL at 08:59

## 2018-07-17 RX ADMIN — ONDANSETRON 4 MG: 2 INJECTION, SOLUTION INTRAMUSCULAR; INTRAVENOUS at 07:36

## 2018-07-17 RX ADMIN — AMLODIPINE BESYLATE 10 MG: 5 TABLET ORAL at 12:03

## 2018-07-17 RX ADMIN — METOPROLOL TARTRATE 5 MG: 5 INJECTION, SOLUTION INTRAVENOUS at 07:36

## 2018-07-17 RX ADMIN — HYDROMORPHONE HYDROCHLORIDE 2 MG: 2 TABLET ORAL at 12:05

## 2018-07-17 RX ADMIN — ENOXAPARIN SODIUM 40 MG: 40 INJECTION, SOLUTION INTRAVENOUS; SUBCUTANEOUS at 08:59

## 2018-07-17 RX ADMIN — KETOROLAC TROMETHAMINE 15 MG: 30 INJECTION, SOLUTION INTRAMUSCULAR; INTRAVENOUS at 12:04

## 2018-07-17 RX ADMIN — ACETAMINOPHEN 1000 MG: 10 INJECTION, SOLUTION INTRAVENOUS at 01:44

## 2018-07-17 RX ADMIN — HYDROMORPHONE HYDROCHLORIDE 4 MG: 2 TABLET ORAL at 21:10

## 2018-07-17 RX ADMIN — LISINOPRIL 40 MG: 20 TABLET ORAL at 12:03

## 2018-07-17 RX ADMIN — HYDROMORPHONE HYDROCHLORIDE 2 MG: 2 TABLET ORAL at 16:09

## 2018-07-17 RX ADMIN — HYDROMORPHONE HYDROCHLORIDE 2 MG: 2 TABLET ORAL at 08:59

## 2018-07-17 RX ADMIN — Medication 10 ML: at 21:10

## 2018-07-17 NOTE — CDMP QUERY
Please clarify if this patient is (was) being treated/managed for:     => Atelectasis in the post-op setting requiring incentive spirometer Q 1 hour while awake.  => Other explanation of clinical findings  => Clinically Undetermined (no explanation for clinical findings)    The medical record reflects the following clinical findings, treatment, and risk factors. Risk Factors:  post-op  Clinical Indicators:    CXR: There are low lung finds with perihilar and bibasilar atelectasis; Low lung volumes with bilateral atelectasis  Treatment: IS Q1 hour while awake; elevate HOB 30'    Please clarify and document your clinical opinion in the progress notes and discharge summary including the definitive and/or presumptive diagnosis, (suspected or probable), related to the above clinical findings. Please include clinical findings supporting your diagnosis.     Thank you,  Teri Bello RN  [phone]

## 2018-07-17 NOTE — ROUTINE PROCESS
Bedside and Verbal shift change report given to Yan Sanders (oncoming nurse) by Myesha Santoro (offgoing nurse). Report included the following information SBAR, Kardex, OR Summary, Intake/Output, Recent Results and Cardiac Rhythm NSR.

## 2018-07-17 NOTE — PROGRESS NOTES
Problem: Falls - Risk of  Goal: *Absence of Falls  Document Aubrey Fall Risk and appropriate interventions in the flowsheet.    Outcome: Progressing Towards Goal  Fall Risk Interventions:            Medication Interventions: Teach patient to arise slowly

## 2018-07-17 NOTE — PROGRESS NOTES
General Surgery Daily Progress Note    Admit Date: 2018  Post-Operative Day: 1 Day Post-Op from Procedure(s):  LAPAROSCOPIC TAKE DOWN NISSEN FUNDOPLICATION ,LAPAROSCOPIC HIATAL HERNIA REPAIR WITH MESH,  LAPAROSCOPIC GASTRIC BYPASS PARTIAL GASTRECTOMY AND EGD, EGD   ESOPHAGOGASTRODUODENOSCOPY (EGD)     Subjective:     Last 24 hrs:   Visited with patient briefly as headed down for UGI. Sitting in bedside chair. Denies NV  Abdominal soreness adequately treated with analgesics. Reports bilateral arm weakness & numbness in fingers, sparing 5th digits  Denies neck pain, fevers, chills or chest pain. Objective:     Blood pressure (!) 160/95, pulse (!) 101, temperature 98.3 °F (36.8 °C), resp. rate 18, height 5' 9\" (1.753 m), weight 282 lb 3 oz (128 kg), SpO2 91 %. Temp (24hrs), Av °F (36.7 °C), Min:97.4 °F (36.3 °C), Max:98.5 °F (36.9 °C)      _____________________  Physical Exam:     Alert and Oriented, cooperaive, in no acute distress. Cardiovascular: RRR  Lungs:no resp distress  Abdomen: deferred      Assessment:   Active Problems: Morbid obesity ([address] (2018)            Plan:     UGI  Evaluate arm weakness & hand paresthesias upon return  NPO until study read  Analgesics as needed  Further plan per Dr. Jabier Abraham    Data Review:    Recent Labs      18   0354   WBC  13.4*   HGB  12.3   HCT  35.9*   PLT  225     Recent Labs      18   0354   NA  142   K  2.8*   CL  105   CO2  26   GLU  94   BUN  12   CREA  1.13   CA  7.8*     No results for input(s): AML, LPSE in the last 72 hours.         ______________________  Medications:    Current Facility-Administered Medications   Medication Dose Route Frequency    iohexol (OMNIPAQUE) 350 mg iodine/mL contrast injection 100 mL  100 mL Oral RAD ONCE    sodium chloride (NS) flush 5-10 mL  5-10 mL IntraVENous Q8H    sodium chloride (NS) flush 5-10 mL  5-10 mL IntraVENous PRN    naloxone (NARCAN) injection 0.4 mg  0.4 mg IntraVENous PRN    lactated Ringers infusion  125 mL/hr IntraVENous CONTINUOUS    ondansetron (ZOFRAN) injection 4 mg  4 mg IntraVENous Q4H PRN    diphenhydrAMINE (BENADRYL) injection 25 mg  25 mg IntraVENous ONCE PRN    enoxaparin (LOVENOX) injection 40 mg  40 mg SubCUTAneous Q24H    metoprolol (LOPRESSOR) injection 5 mg  5 mg IntraVENous Q6H    scopolamine (TRANSDERM-SCOP) 1 mg over 3 days 1 Patch  1 Patch TransDERmal Q72H    LORazepam (ATIVAN) injection 1 mg  1 mg IntraVENous Q6H PRN    acetaminophen (OFIRMEV) infusion 1,000 mg  1,000 mg IntraVENous Q8H    ketorolac (TORADOL) injection 15 mg  15 mg IntraVENous Q6H    morphine injection 2-4 mg  2-4 mg IntraVENous Q2H PRN       Aleida Clarke PA-C  7/17/2018

## 2018-07-17 NOTE — PROGRESS NOTES
Bedside shift change report given to [facility], RN (oncoming nurse) by April, RN (offgoing nurse).  Report included the following information SBAR, Kardex, Intake/Output, MAR, Accordion and Cardiac Rhythm ST.

## 2018-07-17 NOTE — DISCHARGE INSTRUCTIONS
Gastric Bypass  Patient Discharge Instructions  General Surgery - [address]  [phone]    1. Activities: You may be active walking, stair climbing, and doing light weight activities with one to two-pound weights, sitting in a chair using different arm motions. Major restrictions include driving until your first office visit and lifting anything heavier than ten pounds. It is very important that you walk frequently. In addition to walking, you should continue to use your incentive spirometer (breathing exercises) throughout the day. 2. Caring for vour incision (s}: Your surgical incision(s) will be covered with Derma bond (super glue). You may shower as desired and simply pat dry the area over the incision(s). There may occasionally be seepage of yellowish to yellowish-maroon dissolved fat from your wound, which is of no major concern as long as the wound is not red, hardened in the area of the drainage, or if the drainage has a foul smell. If there are any questions, call our office for further instructions. If there is this type of dissolved fat drainage,  the shower and gently express the fluid from your wound. Then keep gauze pads over the area to protect both the wound and your clothes. 3. When to call the office immediately:      *Chest pain (not associated with eating/drinking)  *Shortness of breath (more than normal)  *Sudden pain and/or redness in calf  *Fever greater than 101F  *Persistent nausea and/or vomiting (unable to keep down any liquids)  *Bleeding from incision(s)  *Severe abdominal pain  *Any other concerning symptoms    4. Diet: There are three main priorities as far as your diet is concerned---    a. Clear Liquids-drink from 1 oz. cups or standard shot glass. These liquids are non-carbonated, no added sugar, and not irritating to your stomach.  They may include water, tea, coffee\" 100% no added sugar juices (avoid citrus) , clear broth, blenderized clear soups such as [employer] Healthy Request Chicken Noodle and Chicken & Rice, Sugar-free products including popsicles, Mark-Aid, and Crystal Lite. b. Vitamins: Multi-vitamin with iron in the morning and evening, making sure  it's not the first thing or the last thing taken. The other Vitamin B-12 and  vitamin A & D capsule may be taken once during the day anytime that you  can make a routine of it.    c. Protein Intake: During your initial two-three weeks when your body is switching from burning glucose to directly burning fat, there is an attempt by your body to use protein as a fuel. To protect that protein, we like you to exercise as listed above, and to try to take in 50-60 grams of protein per day. This can be done with four 8 oz. serving of skim milk and Low Carb [place] Instant Breakfast. Each 8 oz. should be considered a meal-breakfast, lunch, or supper, and during this mealtime it should be the sole ingested substance. Stop your clear liquids for 20 minutes before your start on the instant breakfast, which is sipped 1 oz. at a time. You may also try the following substitute for [place] Instant Breakfast: skim milk-fat free powdered milk + Egg Beaters + flavor extract. If desired, ice may be added and blenderized in the . If the milk upsets your stomach, you may purchase Lactaid tablets from any drug store. Lactaid skim milk may be purchased at most major supermarkets. Another alternative is Boost Diabetic, which is Lactose-free and ready to drink. There are many protein supplements on the market. Whey and Soy based protein powders/drinks are acceptable. If you have any questions about specific products please call the office. If you are having difficulty with your diet, please call the office. 5. Medications: Take no more than 2 pills at a time. Wait 20 minutes between pills. a. Vitamins as listed above---multi-vitamin with iron twice a day, B-12 once a day, vitamin A& D once a day.     b. Acid reducing medicine--Will be prescribed by your surgeon at discharge. c. Mylanta Plus--one to two teaspoons as needed for belching or gas (other gas relieving medicines are also acceptable Beano, GasX, etc). d. Bowel Regulation--mild laxatives are permissible such as Milk of Magnesia, Dulcolax (either by mouth or suppository). If you are accustomed to using a [id number] laxative not mentioned, you may continue to use it. Fibercon tablets or Benefiber may be taken as a fiber supplement to regulate bowel movements. Fibercon tablets should be broken in half and taken in half and taken one half in the morning and one half in the evening. Benefiber (1 tsp.) can be added to your protein drink(s). It has no taste or added thickness. Imodium AD may be taken as needed for diarrhea.    e. Pain medication-one to two every four hours as needed for pain control. If pain is mild, try extra-strength Tylenol first.    f. Do not take any pain or arthritis medication such as Aspirin, Advil, Aleve, Naprosyn, or any other nonsteroidal anti-inflammatory medication unless approved by your surgeon.    A Tylenol product is OK.    g. Preadmission medications may be resumed, but this should be discussed without your doctor before your discharge from the hospital.    Future Appointments  Date Time Provider Telly Santana   7/30/2018 9:20 AM JOSEPH Frias   8/13/2018 9:20 AM JOSEPH Frias   8/27/2018 9:20 AM JOSEPH Frias

## 2018-07-17 NOTE — PROGRESS NOTES
Problem: Patient Education: Go to Patient Education Activity  Goal: Patient/Family Education  NUTRITION     Post-op bariatric diet instruction completed. Will follow up for any further questions as needed.     RONAL SantiagoR

## 2018-07-18 VITALS
SYSTOLIC BLOOD PRESSURE: 179 MMHG | BODY MASS INDEX: 42.32 KG/M2 | WEIGHT: 285.72 LBS | TEMPERATURE: 99 F | OXYGEN SATURATION: 92 % | HEART RATE: 109 BPM | DIASTOLIC BLOOD PRESSURE: 105 MMHG | RESPIRATION RATE: 20 BRPM | HEIGHT: 69 IN

## 2018-07-18 LAB
ANION GAP SERPL CALC-SCNC: 12 MMOL/L (ref 5–15)
BASOPHILS # BLD: 0 K/UL (ref 0–0.1)
BASOPHILS NFR BLD: 0 % (ref 0–1)
BUN SERPL-MCNC: 8 MG/DL (ref 6–20)
BUN/CREAT SERPL: 9 (ref 12–20)
CALCIUM SERPL-MCNC: 8.1 MG/DL (ref 8.5–10.1)
CHLORIDE SERPL-SCNC: 102 MMOL/L (ref 97–108)
CO2 SERPL-SCNC: 26 MMOL/L (ref 21–32)
CREAT SERPL-MCNC: 0.9 MG/DL (ref 0.7–1.3)
DIFFERENTIAL METHOD BLD: ABNORMAL
EOSINOPHIL # BLD: 0.2 K/UL (ref 0–0.4)
EOSINOPHIL NFR BLD: 1 % (ref 0–7)
ERYTHROCYTE [DISTWIDTH] IN BLOOD BY AUTOMATED COUNT: 14.4 % (ref 11.5–14.5)
GLUCOSE SERPL-MCNC: 83 MG/DL (ref 65–100)
HCT VFR BLD AUTO: 34.2 % (ref 36.6–50.3)
HGB BLD-MCNC: 11.8 G/DL (ref 12.1–17)
IMM GRANULOCYTES # BLD: 0 K/UL (ref 0–0.04)
IMM GRANULOCYTES NFR BLD AUTO: 0 % (ref 0–0.5)
LYMPHOCYTES # BLD: 2.4 K/UL (ref 0.8–3.5)
LYMPHOCYTES NFR BLD: 20 % (ref 12–49)
MCH RBC QN AUTO: 30.3 PG (ref 26–34)
MCHC RBC AUTO-ENTMCNC: 34.5 G/DL (ref 30–36.5)
MCV RBC AUTO: 87.7 FL (ref 80–99)
MONOCYTES # BLD: 1.1 K/UL (ref 0–1)
MONOCYTES NFR BLD: 9 % (ref 5–13)
NEUTS SEG # BLD: 8.3 K/UL (ref 1.8–8)
NEUTS SEG NFR BLD: 70 % (ref 32–75)
NRBC # BLD: 0 K/UL (ref 0–0.01)
NRBC BLD-RTO: 0 PER 100 WBC
PLATELET # BLD AUTO: 210 K/UL (ref 150–400)
PMV BLD AUTO: 12.1 FL (ref 8.9–12.9)
POTASSIUM SERPL-SCNC: 2.9 MMOL/L (ref 3.5–5.1)
RBC # BLD AUTO: 3.9 M/UL (ref 4.1–5.7)
SODIUM SERPL-SCNC: 140 MMOL/L (ref 136–145)
WBC # BLD AUTO: 12 K/UL (ref 4.1–11.1)

## 2018-07-18 PROCEDURE — 36415 COLL VENOUS BLD VENIPUNCTURE: CPT | Performed by: SURGERY

## 2018-07-18 PROCEDURE — 74011250637 HC RX REV CODE- 250/637: Performed by: SURGERY

## 2018-07-18 PROCEDURE — 85025 COMPLETE CBC W/AUTO DIFF WBC: CPT | Performed by: SURGERY

## 2018-07-18 PROCEDURE — 80048 BASIC METABOLIC PNL TOTAL CA: CPT | Performed by: SURGERY

## 2018-07-18 PROCEDURE — 74011250636 HC RX REV CODE- 250/636: Performed by: SURGERY

## 2018-07-18 RX ORDER — HYDROMORPHONE HYDROCHLORIDE 2 MG/1
2-4 TABLET ORAL
Qty: 30 TAB | Refills: 0 | Status: SHIPPED | OUTPATIENT
Start: 2018-07-18 | End: 2018-08-27 | Stop reason: ALTCHOICE

## 2018-07-18 RX ORDER — POTASSIUM CHLORIDE 1125 MG/1
15 TABLET, EXTENDED RELEASE ORAL 2 TIMES DAILY
Qty: 4 TAB | Refills: 0 | Status: SHIPPED | OUTPATIENT
Start: 2018-07-18 | End: 2018-07-20

## 2018-07-18 RX ORDER — POTASSIUM CHLORIDE 7.45 MG/ML
10 INJECTION INTRAVENOUS ONCE
Status: COMPLETED | OUTPATIENT
Start: 2018-07-18 | End: 2018-07-18

## 2018-07-18 RX ORDER — POTASSIUM CHLORIDE 20MEQ/15ML
20 LIQUID (ML) ORAL DAILY
Status: DISCONTINUED | OUTPATIENT
Start: 2018-07-18 | End: 2018-07-18 | Stop reason: HOSPADM

## 2018-07-18 RX ADMIN — ENOXAPARIN SODIUM 40 MG: 40 INJECTION, SOLUTION INTRAVENOUS; SUBCUTANEOUS at 08:29

## 2018-07-18 RX ADMIN — POTASSIUM CHLORIDE 10 MEQ: 10 INJECTION, SOLUTION INTRAVENOUS at 08:29

## 2018-07-18 RX ADMIN — Medication 10 ML: at 06:00

## 2018-07-18 RX ADMIN — LABETALOL HYDROCHLORIDE 300 MG: 100 TABLET, FILM COATED ORAL at 08:29

## 2018-07-18 RX ADMIN — HYDROMORPHONE HYDROCHLORIDE 4 MG: 2 TABLET ORAL at 08:29

## 2018-07-18 RX ADMIN — POTASSIUM CHLORIDE 20 MEQ: 20 SOLUTION ORAL at 08:30

## 2018-07-18 RX ADMIN — HYDROMORPHONE HYDROCHLORIDE 4 MG: 2 TABLET ORAL at 04:17

## 2018-07-18 RX ADMIN — AMLODIPINE BESYLATE 10 MG: 5 TABLET ORAL at 08:29

## 2018-07-18 RX ADMIN — LISINOPRIL 40 MG: 20 TABLET ORAL at 08:29

## 2018-07-18 NOTE — PROGRESS NOTES
Progress Note    Patient: Dontae Browne MRN: [id number]  SSN: [id number]    YOB: 1970  Age: 56 y.o. Sex: male      Admit Date: 2018    2 Days Post-Op    Procedure:  Procedure(s):  LAPAROSCOPIC TAKE DOWN NISSEN FUNDOPLICATION ,LAPAROSCOPIC HIATAL HERNIA REPAIR WITH MESH,  LAPAROSCOPIC GASTRIC BYPASS PARTIAL GASTRECTOMY AND EGD    Subjective:     Patient has adequate pain control; feeling returning to fingers; tolerating 4 oz/hr liquids. Objective:     Visit Vitals    BP (!) 171/93 (BP 1 Location: Right arm, BP Patient Position: At rest)    Pulse (!) 104    Temp 98.6 °F (37 °C)    Resp 20    Ht 5' 9\" (1.753 m)    Wt 285 lb 11.5 oz (129.6 kg)    SpO2 92%    BMI 42.19 kg/m2       Temp (24hrs), Av.6 °F (37 °C), Min:97.5 °F (36.4 °C), Max:99.4 °F (37.4 °C)      Physical Exam:    ABDOMEN: Obese, non-distended, soft; serous drain fluid; incisions intact without signs of infection; appropriate incisional pain with palpation. Data Review: VS, I/O's, Labs    Lab Review:   Recent Results (from the past 12 hour(s))   CBC WITH AUTOMATED DIFF    Collection Time: 18  4:06 AM   Result Value Ref Range    WBC 12.0 (H) 4.1 - 11.1 K/uL    RBC 3.90 (L) 4.10 - 5.70 M/uL    HGB 11.8 (L) 12.1 - 17.0 g/dL    HCT 34.2 (L) 36.6 - 50.3 %    MCV 87.7 80.0 - 99.0 FL    MCH 30.3 26.0 - 34.0 PG    MCHC 34.5 30.0 - 36.5 g/dL    RDW 14.4 11.5 - 14.5 %    PLATELET 026 230 - 199 K/uL    MPV 12.1 8.9 - 12.9 FL    NRBC 0.0 0  WBC    ABSOLUTE NRBC 0.00 0.00 - 0.01 K/uL    NEUTROPHILS 70 32 - 75 %    LYMPHOCYTES 20 12 - 49 %    MONOCYTES 9 5 - 13 %    EOSINOPHILS 1 0 - 7 %    BASOPHILS 0 0 - 1 %    IMMATURE GRANULOCYTES 0 0.0 - 0.5 %    ABS. NEUTROPHILS 8.3 (H) 1.8 - 8.0 K/UL    ABS. LYMPHOCYTES 2.4 0.8 - 3.5 K/UL    ABS. MONOCYTES 1.1 (H) 0.0 - 1.0 K/UL    ABS. EOSINOPHILS 0.2 0.0 - 0.4 K/UL    ABS. BASOPHILS 0.0 0.0 - 0.1 K/UL    ABS. IMM.  GRANS. 0.0 0.00 - 0.04 K/UL    DF AUTOMATED     METABOLIC PANEL, BASIC    Collection Time: 18  4:06 AM   Result Value Ref Range    Sodium 140 136 - 145 mmol/L    Potassium 2.9 (L) 3.5 - 5.1 mmol/L    Chloride 102 97 - 108 mmol/L    CO2 26 21 - 32 mmol/L    Anion gap 12 5 - 15 mmol/L    Glucose 83 65 - 100 mg/dL    BUN 8 6 - 20 MG/DL    Creatinine 0.90 0.70 - 1.30 MG/DL    BUN/Creatinine ratio 9 (L) 12 - 20      GFR est AA >60 >60 ml/min/1.73m2    GFR est non-AA >60 >60 ml/min/1.73m2    Calcium 8.1 (L) 8.5 - 10.1 MG/DL         Assessment:     Hospital Problems  Date Reviewed: 2018          Codes Class Noted POA    Morbid obesity ([facility] ICD-10-CM: E66.01  ICD-9-CM: 278.01  2018 Unknown              Plan/Recommendations/Medical Decision Makin. Replete K+. 2. Remove drain. 3. Discharge to home.     Signed By: Carroll Garcia MD     2018

## 2018-07-18 NOTE — DISCHARGE SUMMARY
Physician Discharge Summary     Patient ID:  Hao Cardenas  [id number]  56 y.o.  1970    Allergies: Review of patient's allergies indicates no known allergies. Admit Date: 7/16/2018    Discharge Date: 7/18/2018    * Admission Diagnoses: MORBID OBESITY; Morbid obesity ([facility]    * Discharge Diagnoses:    Hospital Problems as of 7/18/2018  Date Reviewed: 7/4/2018          Codes Class Noted - Resolved POA    Morbid obesity ([facility] [address] ICD-10-CM: E66.01  ICD-9-CM: 278.01  7/16/2018 - Present Unknown               Admission Condition: Good    * Discharge Condition: good    * Procedures: Procedure(s):  LAPAROSCOPIC TAKE DOWN NISSEN FUNDOPLICATION ,LAPAROSCOPIC HIATAL HERNIA REPAIR WITH MESH,  LAPAROSCOPIC GASTRIC BYPASS PARTIAL GASTRECTOMY AND EGD, EGD   ESOPHAGOGASTRODUODENOSCOPY (EGD)    * Hospital Course:   Normal hospital course for this procedure. Mild atelectasis on day of surgery CXR managed with [facility] spirometry. POD#1 UGI without leak or obstruction. Excellent tolerance of bariatric liquids at time of discharge. Consults: None    Significant Diagnostic Studies: radiology: POD#1 UGI: no leak or obstruction    * Disposition: Home    Discharge Medications:   Discharge Medication List as of 7/18/2018 11:29 AM      START taking these medications    Details   HYDROmorphone (DILAUDID) 2 mg tablet Take 1-2 Tabs by mouth every four (4) hours as needed. Max Daily Amount: 24 mg., Print, Disp-30 Tab, R-0      potassium chloride SA (KLOR-CON M15) 15 mEq tablet Take 1 Tab by mouth two (2) times a day for 2 days. , Print, Disp-4 Tab, R-0         CONTINUE these medications which have NOT CHANGED    Details   lisinopril (PRINIVIL, ZESTRIL) 40 mg tablet Take 40 mg by mouth daily. , Historical Med      amLODIPine (NORVASC) 10 mg tablet Take 10 mg by mouth daily. , Historical Med      labetalol (NORMODYNE) 100 mg tablet Take 300 mg by mouth two (2) times a day., Historical Med      allopurinol (ZYLOPRIM) 300 mg tablet Take 300 mg by mouth., Historical Med      Dexlansoprazole 60 mg CpDB Take 60 mg by mouth., Historical Med      sertraline (ZOLOFT) 100 mg tablet Take 100 mg by mouth., Historical Med      traZODone (DESYREL) 100 mg tablet Take 100 mg by mouth., Historical Med      ergocalciferol (ERGOCALCIFEROL) 50,000 unit capsule Take 1 Cap by mouth every seven (7) days. , Normal, Disp-12 Cap, R-0      ondansetron (ZOFRAN ODT) 4 mg disintegrating tablet Take 1 Tab by mouth every eight (8) hours as needed for Nausea., Normal, Disp-30 Tab, R-0      simvastatin (ZOCOR) 10 mg tablet Take 10 mg by mouth., Historical Med         STOP taking these medications       hydroCHLOROthiazide (HYDRODIURIL) 25 mg tablet Comments:   Reason for Stopping:               * Follow-up Care/Patient Instructions:   Activity: No heavy lifting, pushing, pulling x 4 weeks  Diet: bariatric liquids  Wound Care: Keep wounds clean and dry    Follow-up Information     Follow up With Details Comments Contact Info    Austyn Mayen MD   Patient can only remember the practice name and not the physician            Future Appointments  Date Time Provider Telly Esther   7/30/2018 9:20 AM JOSEPH Gordon [address] [address]   8/13/2018 9:20 AM JOSEPH Gordon SCHED   8/27/2018 9:20 AM JOSEPH Gordon SCHED         Signed:  Chapincito Montgomery MD  7/18/2018  7:49 PM

## 2018-07-20 ENCOUNTER — TELEPHONE (OUTPATIENT)
Dept: SURGERY | Age: 48
End: 2018-07-20

## 2018-07-20 NOTE — TELEPHONE ENCOUNTER
Bariatric Post-Operative Phone Calls: 48 hour phone call    Diet:Question of any nausea and/or vomiting. Protein intake (goal is 60 grams of protein daily)   Poor____Fair____Good____Great__x__     Comment:______________________________________________________________      ______________________________________________________________________    Hydration:Less than 32 ounces of water daily is fair to poor (Goal is 64 ounces per day)   Poor____ Fair____ Good____Great_x___    Comment:______________________________________________________________    ______________________________________________________________________      Ambulation:( walking at least 3 x week, for 15- 20 minutes)     Poor______ Fair______ Good______     Great____x__ Comment:__________________________________________________    ______________________________________________________________________      Urine Color: Question of any odor and color(should be monica, pale, and clear) Dark______ Amber______ Pale______      Clear__x____ Comment:___________________________________________________                           ________________________________________________________________    Bowel movements: Question of any constipation- haven't had any bowel movements for more than 3 days. This could be related to protein intake and/or narcotic pain medication usage. Comment:                                                                                     okay                                         Pain: Left sided abdominal pain is normal (should be less than 3)  Question if pain medication is helpful.  10___ 9___ 8___ 7___ 6___ 5___ 4___ 3_x__     2___1___0___Comment:_________________________________________________    ______________________________________________________________________      Incision: (No redness, pain, swelling or fever) Healing Well____x__     Healed______Redness_________ Pain_________     Swelling_________ Fever__________(greater than 101 needs evaluation)    Comment:____________________________________________________________    ______________________________________________________________________  Use of incentive spirometer: Yes__x__       No           Next Appointment:___7/30/18___________                 Support Group: Yes______No__x____    Additional Comments:__he states he plans to attend the support groups at the hospital. __________________________________________________________    ____________________________________________________________________      If more than one parameter is not met or considered poor, nurse needs to discuss with provider recommend for patient to be seen in the office as soon as possible or refer to the provider for follow-up. Reinforce to patient to use bariatric educational booklet as guide. It is appropriate to refer patient to the nutritionist to discuss more in detail of diet and nutrition.

## 2018-07-20 NOTE — TELEPHONE ENCOUNTER
----- Message from Timothy Garcia LPN sent at   3:13 PM EDT -----  Regardin day post op call  Lap gastric bypass discharged on 18

## 2018-07-30 ENCOUNTER — OFFICE VISIT (OUTPATIENT)
Dept: SURGERY | Age: 48
End: 2018-07-30

## 2018-07-30 VITALS
WEIGHT: 264.6 LBS | RESPIRATION RATE: 18 BRPM | OXYGEN SATURATION: 97 % | HEIGHT: 69 IN | TEMPERATURE: 98.4 F | HEART RATE: 89 BPM | SYSTOLIC BLOOD PRESSURE: 122 MMHG | DIASTOLIC BLOOD PRESSURE: 88 MMHG | BODY MASS INDEX: 39.19 KG/M2

## 2018-07-30 DIAGNOSIS — E66.01 MORBID OBESITY (HCC): Primary | ICD-10-CM

## 2018-07-30 DIAGNOSIS — Z09 SURGERY FOLLOW-UP: ICD-10-CM

## 2018-07-30 RX ORDER — CYANOCOBALAMIN 500 UG/1
1 SPRAY NASAL
COMMUNITY
Start: 2018-07-03

## 2018-07-30 RX ORDER — BISMUTH SUBSALICYLATE 262 MG
1 TABLET,CHEWABLE ORAL 2 TIMES DAILY
COMMUNITY

## 2018-07-30 RX ORDER — LANOLIN ALCOHOL/MO/W.PET/CERES
325 CREAM (GRAM) TOPICAL
COMMUNITY

## 2018-07-30 NOTE — PROGRESS NOTES
First follow up for lap Nissen on 7/16/18. 1. Have you been to the ER, urgent care clinic since your last visit? Hospitalized since your last visit? No    2. Have you seen or consulted any other health care providers outside of the [address] [place] [address] since your last visit? Include any pap smears or colon screening.  No      Weight loss since surgery:  29.5 lb

## 2018-07-30 NOTE — PATIENT INSTRUCTIONS
Constipation: Care Instructions  Your Care Instructions    Constipation means that you have a hard time passing stools (bowel movements). People pass stools from 3 times a day to once every 3 days. What is normal for you may be different. Constipation may occur with pain in the rectum and cramping. The pain may get worse when you try to pass stools. Sometimes there are small amounts of bright red blood on toilet paper or the surface of stools. This is because of enlarged veins near the rectum (hemorrhoids). A few changes in your diet and lifestyle may help you avoid ongoing constipation. Your doctor may also prescribe medicine to help loosen your stool. Some medicines can cause constipation. These include pain medicines and antidepressants. Tell your doctor about all the medicines you take. Your doctor may want to make a medicine change to ease your symptoms. Follow-up care is a key part of your treatment and safety. Be sure to make and go to all appointments, and call your doctor if you are having problems. It's also a good idea to know your test results and keep a list of the medicines you take. How can you care for yourself at home? · Drink plenty of fluids, enough so that your urine is light yellow or clear like water. If you have kidney, heart, or liver disease and have to limit fluids, talk with your doctor before you increase the amount of fluids you drink. · Include high-fiber foods in your diet each day. These include fruits, vegetables, beans, and whole grains. · Get at least 30 minutes of exercise on most days of the week. Walking is a good choice. You also may want to do other activities, such as running, swimming, cycling, or playing tennis or team sports. · Take a fiber supplement, such as Citrucel or Metamucil, every day. Read and follow all instructions on the label. · Schedule time each day for a bowel movement. A daily routine may help.  Take your time having your bowel movement. · Support your feet with a small step stool when you sit on the toilet. This helps flex your hips and places your pelvis in a squatting position. · Your doctor may recommend an over-the-counter laxative to relieve your constipation. Examples are Milk of Magnesia and MiraLax. Read and follow all instructions on the label. Do not use laxatives on a long-term basis. When should you call for help? Call your doctor now or seek immediate medical care if:    · You have new or worse belly pain.     · You have new or worse nausea or vomiting.     · You have blood in your stools.    Watch closely for changes in your health, and be sure to contact your doctor if:    · Your constipation is getting worse.     · You do not get better as expected. Where can you learn more? Go to http://mervin-ashish.info/. Enter 21 [phone] in the search box to learn more about \"Constipation: Care Instructions. \"  Current as of: November 20, 2017  Content Version: 11.7  © 5523-6595 [employer]. Care instructions adapted under license by [employer] (which disclaims liability or warranty for this information). If you have questions about a medical condition or this instruction, always ask your healthcare professional. [address] any warranty or liability for your use of this information. What you need to know:  1. Advance your diet to soft foods. Follow the handout that you were given today in the office. 2.  Take the recommended vitamins daily  3. No lifting greater than 20 lbs. 4.  You can do light jogging and walking. 5  Follow up in 2 weeks. 6.  You may go into a pool. 7.  If you are not able to tolerate liquids or soft foods. Please call our office. [phone]  8. If you have vomiting and persistent epigastric pain or chest pain. You should call our office, the doctor on-call or go to the emergency room. What to do if you are constipated:   You may  take Milk of Magnesia. Take 2 Tablespoons followed by 16 oz of water then 2 hours later take another 2 tablespoons. If  milk of magnesia does not work then take [place] or Miralax over the counter. Keep in mind that the Benefiber or Miralax may take a day or two to work. If all of the above do not work try a Fleets enema and follow the directions on the box. Soft and Mushy: Phase 1    Below is a list of basic items to purchase for the first phase of the   soft mushy diet. Your surgeon or nurse practitioner will inform you when it is okay   to advance to the next phase. Soft and Mushy Foods: Prepare food to the appropriate texture. ? Everything on clear and full liquid diet  ? Applesauce (no sugar added)  ? Hot & cold cereals (Cream of Wheat, Plain Cheerios®, Special K with protein®, plain oatmeal, grits)  ? Frozen or canned vegetables (carrots, acorn squash, butternut squash, string beans, spinach, broccoli, cauliflower - florets only!)   ? Canned fruit (in natural juice or with Splenda®)  ? Fat-free, cholesterol-free egg substitute (P)  ? Low-fat or fat-free cottage cheese (P)  ? Low-fat or fat-free yogurt (P)  ? Low-fat or fat-free [place] yogurt (P)  ? Fat-free milk or 1% milk (P)  ? Lactaid fat-free or 1% low fat milk (P)  ? Low-fat well-cooked/soft beans (the consistency of refried beans) (P)  ? No sugar added, low fat pudding (no pistachio or other flavor containing nuts)  ? low-fat cream soups  ? Low-fat chicken noodle or chicken rice soup (P)  ? Sugar-free fudgesicles  ? Sugar-free cocoa  ? Fat free whipped or mashed potatoes   ? Herbs and spices  ? Lite butter, margarine, canola oil, olive oil, reduced-fat or fat-free mayonnaise, reduced-fat or fat-free salad dressing, reduced-fat or fat-free cream cheese, reduced-fat or fat-free sour cream.        P designates food sources of protein.  Include a protein at each meal.     If a food does not contain protein, you may want to consider adding protein powder to the food to give it extra protein. For example, mix protein powder in with the following: oatmeal, mashed potatoes, sugar-free pudding, sugar-free gelatin (see recipes in book), no-sugar-added applesauce. Soft Mushy Diet: Phase 1  Time Meal or Snack Soft/Mushy Food Amount (ounces) Protein  (g) Supplement   6:30 am Sip on Fluids Sip on non-carbonated, calorie-free, no sugar added liquids. 8 oz   0 g Take Multivitamin containing 18 mg ferrous sulfate (iron)    7:00 am   Stop drinking fluids 30 minutes before breakfast   7:30 am Breakfast ½ cup sugar-free oatmeal with 1 scoop of protein powder. Add cinnamon, nutmeg, artificial sweeteners as desired for flavor. 4 oz    20-25 g    9:00 Snack  (optional) High Protein Gelatin (see recipe in book) 4oz 10 g    11:30 am Stop drinking liquids 30 minutes before lunch   12:00 pm Lunch Sip low-fat cream of potato soup or low-fat cream of chicken soup mixed with 1 scoop of protein powder 8 oz soup 25 g Take 400 mg calcium citrate   2:00 Snack  (optional) ½ cup high protein pudding (see recipe in book)   or   ½ cup low-fat cottage cheese or yogurt. Can also add protein powder as needed. 4 oz 14 g    or    5 g      3:00 - 5:30 pm   Sip on Fluids   Sip on non-carbonated, calorie-free, no sugar added liquids. 24 - 32 oz   0 g   Take 400 mg calcium citrate. 6:00 pm Dinner ¼ cup low-fat, well cooked beans (ex. black beans, low-fat refried beans)  ¼ cup no-sugar-added applesauce 4 oz 3.5 g Take 400 mg of calcium citrate.    7:00 - 10:00 pm Sip on Fluids Sip on non-carbonated, no sugar added liquids as needed  16-24 oz 0 g Take Multivitamin with 18 mg ferrous sulfate   Total:  80 oz clear fluids 63-77  grams 2 Multivitamins with 18 mg ferrous sulfate, 3024-3826 mg calcium citrate

## 2018-07-30 NOTE — MR AVS SNAPSHOT
[address] [facility] 7 [address] 
[phone] Patient: Marcela Dickson MRN: [id number] Visit Information Date & Time Provider Department Dept. Phone Encounter #  
 7/30/2018  9:20 AM Chris Santana NP [facility] [address] 843 [phone] [id number] Your Appointments 8/13/2018  9:20 AM  
POST OP 10 MIN with Chris Santana NP  
[facility] [address] 611 ([facility]) Appt Note: PO 4 weeks follow up, 7-16-18 BC: Lap TAKE-DOWN NISSEN/Lap GASTRIC BYPASS/Lap HIATAL HERNIA REPAIR  
 [address] [facility] [address]  
[phone]  
  
    
 8/27/2018  9:20 AM  
POST OP 10 MIN with Chris Santana NP  
[facility] [address] 929 ([facility]) Appt Note: PO 6 weeks follow up, 7-16-18 BC: Lap TAKE-DOWN NISSEN/Lap GASTRIC BYPASS/Lap HIATAL HERNIA REPAIR  
 [address] [facility] 7 [address] [phone] Upcoming Health Maintenance Date Due DTaP/Tdap/Td series (1 - Tdap) 9/14/1992 Influenza Age 5 to Adult 8/1/2018 Allergies as of 7/30/2018  Review Complete On: 7/30/2018 By: Maliha Vang LPN No Known Allergies Current Immunizations  Never Reviewed No immunizations on file. Not reviewed this visit Vitals BP Pulse Temp Resp Height(growth percentile) Weight(growth percentile) 122/88 (BP 1 Location: Right arm, BP Patient Position: Sitting) 89 98.4 °F (36.9 °C) (Oral) 18 5' 9\" (1.753 m) 264 lb 9.6 oz (120 kg) SpO2 BMI Smoking Status 97% 39.07 kg/m2 Never Smoker BMI and BSA Data Body Mass Index Body Surface Area 39.07 kg/m 2 2.42 m 2 Preferred Pharmacy Pharmacy Name Phone [id number] [address] [facility] Your Updated Medication List  
 This list is accurate as of 7/30/18 10:09 AM.  Always use your most recent med list.  
  
  
  
  
 allopurinol 300 mg tablet Commonly known as:  Dillon Gibbs Take 300 mg by mouth daily. amLODIPine 10 mg tablet Commonly known as:  Wang Alstrom Take 10 mg by mouth daily. CALCIUM CITRATE PO Take 1 Tab by mouth two (2) times a day. Dexlansoprazole 60 mg Cpdb Take 60 mg by mouth.  
  
 ergocalciferol 50,000 unit capsule Commonly known as:  ERGOCALCIFEROL Take 1 Cap by mouth every seven (7) days. HYDROmorphone 2 mg tablet Commonly known as:  DILAUDID Take 1-2 Tabs by mouth every four (4) hours as needed. Max Daily Amount: 24 mg. Iron 325 mg (65 mg iron) tablet Generic drug:  ferrous sulfate Take 325 mg by mouth Daily (before breakfast). labetalol 100 mg tablet Commonly known as:  Jannetta Pore Take 300 mg by mouth two (2) times a day. lisinopril 40 mg tablet Commonly known as:  Jane Loss Take 40 mg by mouth daily. multivitamin tablet Commonly known as:  ONE A DAY Take 1 Tab by mouth two (2) times a day. NASCOBAL 500 mcg/spray Spry Generic drug:  cyanocobalamin  
  
 ondansetron 4 mg disintegrating tablet Commonly known as:  ZOFRAN ODT Take 1 Tab by mouth every eight (8) hours as needed for Nausea. sertraline 100 mg tablet Commonly known as:  ZOLOFT Take 100 mg by mouth daily. simvastatin 10 mg tablet Commonly known as:  ZOCOR Take 10 mg by mouth daily. traZODone 100 mg tablet Commonly known as:  [place] Take 100 mg by mouth nightly. Patient Instructions Constipation: Care Instructions Your Care Instructions Constipation means that you have a hard time passing stools (bowel movements). People pass stools from 3 times a day to once every 3 days. What is normal for you may be different.  Constipation may occur with pain in the rectum and cramping. The pain may get worse when you try to pass stools. Sometimes there are small amounts of bright red blood on toilet paper or the surface of stools. This is because of enlarged veins near the rectum (hemorrhoids). A few changes in your diet and lifestyle may help you avoid ongoing constipation. Your doctor may also prescribe medicine to help loosen your stool. Some medicines can cause constipation. These include pain medicines and antidepressants. Tell your doctor about all the medicines you take. Your doctor may want to make a medicine change to ease your symptoms. Follow-up care is a key part of your treatment and safety. Be sure to make and go to all appointments, and call your doctor if you are having problems. It's also a good idea to know your test results and keep a list of the medicines you take. How can you care for yourself at home? · Drink plenty of fluids, enough so that your urine is light yellow or clear like water. If you have kidney, heart, or liver disease and have to limit fluids, talk with your doctor before you increase the amount of fluids you drink. · Include high-fiber foods in your diet each day. These include fruits, vegetables, beans, and whole grains. · Get at least 30 minutes of exercise on most days of the week. Walking is a good choice. You also may want to do other activities, such as running, swimming, cycling, or playing tennis or team sports. · Take a fiber supplement, such as Citrucel or Metamucil, every day. Read and follow all instructions on the label. · Schedule time each day for a bowel movement. A daily routine may help. Take your time having your bowel movement. · Support your feet with a small step stool when you sit on the toilet. This helps flex your hips and places your pelvis in a squatting position.  
· Your doctor may recommend an over-the-counter laxative to relieve your constipation. Examples are Milk of Magnesia and MiraLax. Read and follow all instructions on the label. Do not use laxatives on a long-term basis. When should you call for help? Call your doctor now or seek immediate medical care if: 
  · You have new or worse belly pain.  
  · You have new or worse nausea or vomiting.  
  · You have blood in your stools.  
 Watch closely for changes in your health, and be sure to contact your doctor if: 
  · Your constipation is getting worse.  
  · You do not get better as expected. Where can you learn more? Go to http://mervinFrontierreashish.info/. Enter 21  in the search box to learn more about \"Constipation: Care Instructions. \" Current as of: November 20, 2017 Content Version: 11.7 © 9038-6673 [employer]. Care instructions adapted under license by [employer] (which disclaims liability or warranty for this information). If you have questions about a medical condition or this instruction, always ask your healthcare professional. [place] [address] any warranty or liability for your use of this information. What you need to know: 1. Advance your diet to soft foods. Follow the handout that you were given today in the office. 2.  Take the recommended vitamins daily 3. No lifting greater than 20 lbs. 4.  You can do light jogging and walking. 5  Follow up in 2 weeks. 6.  You may go into a pool. 7.  If you are not able to tolerate liquids or soft foods. Please call our office. [phone] 
8. If you have vomiting and persistent epigastric pain or chest pain. You should call our office, the doctor on-call or go to the emergency room. What to do if you are constipated: You may  take Milk of Magnesia. Take 2 Tablespoons followed by 16 oz of water then 2 hours later take another 2 tablespoons. If  milk of magnesia does not work then take [place] or Miralax over the counter.  Keep in mind that the Benefiber or Miralax may take a day or two to work. If all of the above do not work try a Fleets enema and follow the directions on the box. Soft and Mushy: Phase 1 Below is a list of basic items to purchase for the first phase of the  
soft mushy diet. Your surgeon or nurse practitioner will inform you when it is okay  
to advance to the next phase. Soft and Mushy Foods: Prepare food to the appropriate texture. ? Everything on clear and full liquid diet ? Applesauce (no sugar added) ? Hot & cold cereals (Cream of Wheat, Plain Cheerios®, Special K with protein®, plain oatmeal, grits) ? Frozen or canned vegetables (carrots, acorn squash, butternut squash, string beans, spinach, broccoli, cauliflower  florets only!) ? Canned fruit (in natural juice or with Splenda®) ? Fat-free, cholesterol-free egg substitute (P) ? Low-fat or fat-free cottage cheese (P) ? Low-fat or fat-free yogurt (P) ? Low-fat or fat-free [place] yogurt (P) ? Fat-free milk or 1% milk (P) ? Lactaid fat-free or 1% low fat milk (P) ? Low-fat well-cooked/soft beans (the consistency of refried beans) (P) ? No sugar added, low fat pudding (no pistachio or other flavor containing nuts) ? low-fat cream soups ? Low-fat chicken noodle or chicken rice soup (P) ? Sugar-free fudgesicles ? Sugar-free cocoa ? Fat free whipped or mashed potatoes ? Herbs and spices ? Lite butter, margarine, canola oil, olive oil, reduced-fat or fat-free mayonnaise, reduced-fat or fat-free salad dressing, reduced-fat or fat-free cream cheese, reduced-fat or fat-free sour cream.  
 
 
 P designates food sources of protein. Include a protein at each meal.  
 
If a food does not contain protein, you may want to consider adding protein powder to the food to give it extra protein.  For example, mix protein powder in with the following: oatmeal, mashed potatoes, sugar-free pudding, sugar-free gelatin (see recipes in book), no-sugar-added applesauce. Soft Mushy Diet: Phase 1 Time Meal or Snack Soft/Mushy Food Amount (ounces) Protein 
(g) Supplement 6:30 am Sip on Fluids Sip on non-carbonated, calorie-free, no sugar added liquids. 8 oz 
 0 g Take Multivitamin containing 18 mg ferrous sulfate (iron) 7:00 am   Stop drinking fluids 30 minutes before breakfast  
7:30 am Breakfast ½ cup sugar-free oatmeal with 1 scoop of protein powder. Add cinnamon, nutmeg, artificial sweeteners as desired for flavor. 4 oz  
 20-25 g   
9:00 Snack (optional) High Protein Gelatin (see recipe in book) 4oz 10 g   
11:30 am Stop drinking liquids 30 minutes before lunch 12:00 pm Lunch Sip low-fat cream of potato soup or low-fat cream of chicken soup mixed with 1 scoop of protein powder 8 oz soup 25 g Take 400 mg calcium citrate 2:00 Snack (optional) ½ cup high protein pudding (see recipe in book) or ½ cup low-fat cottage cheese or yogurt. Can also add protein powder as needed. 4 oz 14 g 
 
or 
 
5 g   
 
3:00  5:30 pm  
Sip on Fluids Sip on non-carbonated, calorie-free, no sugar added liquids. 24 - 32 oz  
0 g Take 400 mg calcium citrate. 6:00 pm Dinner ¼ cup low-fat, well cooked beans (ex. black beans, low-fat refried beans) ¼ cup no-sugar-added applesauce 4 oz 3.5 g Take 400 mg of calcium citrate. 7:00 - 10:00 pm Sip on Fluids Sip on non-carbonated, no sugar added liquids as needed  16-24 oz 0 g Take Multivitamin with 18 mg ferrous sulfate Total:  80 oz clear fluids 63-77 
grams 2 Multivitamins with 18 mg ferrous sulfate, 2996-6097 mg calcium citrate Introducing [facility]! [facility] introduces [employer] patient portal. Now you can access parts of your medical record, email your doctor's office, and request medication refills online. 1. In your internet browser, go to [email or web address] 2. Click on the First Time User? Click Here link in the Sign In box. You will see the New Member Sign Up page. 3. Enter your Proterra Access Code exactly as it appears below. You will not need to use this code after youve completed the sign-up process. If you do not sign up before the expiration date, you must request a new code. · Proterra Access Code: [email or web address] Expires: 10/11/2018  3:00 PM 
 
4. Enter the last four digits of your Social Security Number (xxxx) and Date of Birth (mm/dd/yyyy) as indicated and click Submit. You will be taken to the next sign-up page. 5. Create a Proterra ID. This will be your Proterra login ID and cannot be changed, so think of one that is secure and easy to remember. 6. Create a Proterra password. You can change your password at any time. 7. Enter your Password Reset Question and Answer. This can be used at a later time if you forget your password. 8. Enter your e-mail address. You will receive e-mail notification when new information is available in [address] 9. Click Sign Up. You can now view and download portions of your medical record. 10. Click the Download Summary menu link to download a portable copy of your medical information. If you have questions, please visit the Frequently Asked Questions section of the Proterra website. Remember, Proterra is NOT to be used for urgent needs. For medical emergencies, dial 911. Now available from your iPhone and Android! Please provide this summary of care documentation to your next provider. Your primary care clinician is listed as Phys Other. If you have any questions after today's visit, please call [phone].

## 2018-07-30 NOTE — PROGRESS NOTES
2 weeks status post Nissen takedown, hiatal hernia repair and Laparoscopic Gastric bypass   Pt reports doing well on liquids . Pt's post op pain is at left lateral incision. Pt reports no nausea and no vomiting  He is drinking approximately 60+ oz of water daily  He is drinking 40-55 grams of protein daily.   +Bm, diarrhea      He is taking bariatric vitamins without issue. Total weight loss since surgery 29.5 lbs  Weight loss since last visit 29.5 lbs  Visit Vitals    /88 (BP 1 Location: Right arm, BP Patient Position: Sitting)    Pulse 89    Temp 98.4 °F (36.9 °C) (Oral)    Resp 18    Ht 5' 9\" (1.753 m)    Wt 264 lb 9.6 oz (120 kg)    SpO2 97%    BMI 39.07 kg/m2          Patient has an advanced directive: not on file. Mr. Tiffanie Causey has a reminder for a \"due or due soon\" health maintenance. I have asked that he contact his primary care provider for follow-up on this health maintenance. Physical Examination: General appearance - alert, well appearing, and in no distress,  Chest - clear to auscultation bilaterally  Heart - normal rate, regular rhythm, normal S1, S2, no murmurs, rubs, clicks or gallops  Abdomen - soft, nontender, nondistended  scars from previous incisions healing without erythema or induration    A/P    Doing well 2 wks status post Nissen takedown, hiatal hernia repair and Laparoscopic Gastric bypass   Diet advanced to soft foods. Focus on 50-60 grams of protein daily. Supplement with unflavored protein powder in foods. Encouraged water intake  No lifting greater than 20 lbs. Follow up in 2 weeks. RTW in 1-2 weeks. Ptverbalized understanding and questions were answered to the best of my knowledge and ability.         Leelee Maher NP

## 2018-08-07 ENCOUNTER — TELEPHONE (OUTPATIENT)
Dept: SURGERY | Age: 48
End: 2018-08-07

## 2018-08-07 NOTE — TELEPHONE ENCOUNTER
I called the patient and I left him a voice mail to call me back in regards to his 3 week post op call.

## 2018-08-07 NOTE — TELEPHONE ENCOUNTER
----- Message from Meron Walters LPN sent at 7/67/0371  3:09 PM EDT -----  Regarding: 3 weeks post op call  Lap gastric bypass discharged on 7/17/18  ----- Message -----     From: Priti Nevarez     Sent: 7/19/2018   8:55 AM       To: Meron Walters LPN  Subject: Discharge Phone Call                             Hello,    Please call Mr. Lina Pizarro for his discharge phone call. Thank you!   Sharon

## 2018-08-13 ENCOUNTER — OFFICE VISIT (OUTPATIENT)
Dept: SURGERY | Age: 48
End: 2018-08-13

## 2018-08-13 VITALS
WEIGHT: 256.5 LBS | OXYGEN SATURATION: 98 % | HEIGHT: 69 IN | SYSTOLIC BLOOD PRESSURE: 110 MMHG | RESPIRATION RATE: 18 BRPM | TEMPERATURE: 99.1 F | DIASTOLIC BLOOD PRESSURE: 80 MMHG | HEART RATE: 95 BPM | BODY MASS INDEX: 37.99 KG/M2

## 2018-08-13 DIAGNOSIS — E66.01 MORBID OBESITY (HCC): Primary | ICD-10-CM

## 2018-08-13 DIAGNOSIS — Z09 SURGERY FOLLOW-UP: ICD-10-CM

## 2018-08-13 NOTE — PATIENT INSTRUCTIONS
Constipation: Care Instructions  Your Care Instructions    Constipation means that you have a hard time passing stools (bowel movements). People pass stools from 3 times a day to once every 3 days. What is normal for you may be different. Constipation may occur with pain in the rectum and cramping. The pain may get worse when you try to pass stools. Sometimes there are small amounts of bright red blood on toilet paper or the surface of stools. This is because of enlarged veins near the rectum (hemorrhoids). A few changes in your diet and lifestyle may help you avoid ongoing constipation. Your doctor may also prescribe medicine to help loosen your stool. Some medicines can cause constipation. These include pain medicines and antidepressants. Tell your doctor about all the medicines you take. Your doctor may want to make a medicine change to ease your symptoms. Follow-up care is a key part of your treatment and safety. Be sure to make and go to all appointments, and call your doctor if you are having problems. It's also a good idea to know your test results and keep a list of the medicines you take. How can you care for yourself at home? · Drink plenty of fluids, enough so that your urine is light yellow or clear like water. If you have kidney, heart, or liver disease and have to limit fluids, talk with your doctor before you increase the amount of fluids you drink. · Include high-fiber foods in your diet each day. These include fruits, vegetables, beans, and whole grains. · Get at least 30 minutes of exercise on most days of the week. Walking is a good choice. You also may want to do other activities, such as running, swimming, cycling, or playing tennis or team sports. · Take a fiber supplement, such as Citrucel or Metamucil, every day. Read and follow all instructions on the label. · Schedule time each day for a bowel movement. A daily routine may help.  Take your time having your bowel movement. · Support your feet with a small step stool when you sit on the toilet. This helps flex your hips and places your pelvis in a squatting position. · Your doctor may recommend an over-the-counter laxative to relieve your constipation. Examples are Milk of Magnesia and MiraLax. Read and follow all instructions on the label. Do not use laxatives on a long-term basis. When should you call for help? Call your doctor now or seek immediate medical care if:    · You have new or worse belly pain.     · You have new or worse nausea or vomiting.     · You have blood in your stools.    Watch closely for changes in your health, and be sure to contact your doctor if:    · Your constipation is getting worse.     · You do not get better as expected. Where can you learn more? Go to http://mervin-ashish.info/. Enter 21 [phone] in the search box to learn more about \"Constipation: Care Instructions. \"  Current as of: November 20, 2017  Content Version: 11.7  © 6617-2808 [employer]. Care instructions adapted under license by [employer] (which disclaims liability or warranty for this information). If you have questions about a medical condition or this instruction, always ask your healthcare professional. [address] any warranty or liability for your use of this information. What you need to know:  1 . Advance your diet to moist meats. Follow the handout that you were given today in the office. 2. Take the recommended vitamins daily  3 No lifting greater than 40 lbs. 4. You can do light jogging, moderate walking and a recumbent bike. 5 Follow up in 2 weeks. 6. You may go into a pool. 7. If you are not able to tolerate liquids, soft foods or moist meats. Please call our office. [phone]  8. If you have vomiting and persistent epigastric pain or chest pain. You should call our office, the doctor on-call or go to the emergency room.          What to do if you are constipated: You may  take Milk of Magnesia. Take 2 Tablespoons followed by 16 oz of water then 2 hours later take another 2 tablespoons. If  milk of magnesia does not work then take [place] or Miralax over the counter. Keep in mind that the Benefiber or Miralax may take a day or two to work. If all of the above do not work try a Fleets enema and follow the directions on the box. Shopping List Sta[employer]     Soft Mushy Diet: Phase 2 - Moist Meats     Below is a list of moist meats that you can now introduce into your diet. Moist Meats: Prepare food to the appropriate texture using low-fat cooking   methods       ? Tuna packed in water (strain before eating)  ? White flaky fish (grace, cod, flounder, tilapia, salmon)   ? White chicken breast packed in water (strain before eating)  ? 96-99% fat free thinly sliced deli meat (ham, turkey, roast beef)  ? Fat free non-stick spray  ? Silken Tofu  ? Low-fat or vegetarian refried beans  ? Well-cooked beans and lentils  ? Skinless turkey or chicken (prepare to a soft texture)  ? 93% lean pureed beef (round or sirloin only)  ? Lean pork (cooked until very tender, cut into small pieces)  ? Eggs (preferable egg whites)  ? Egg substitutes  ? Herbs and spices  ? Lite butter, margarine, canola oil, olive oil, reduced-fat or fat-free hoffman, reduced-fat or fat-free salad dressing, reduced-fat or fat-free cream cheese, reduced-fat or fat-free sour cream, lemon juice, salt, pepper, mustard, ketchup, salsa. See patient handbook for more low-fat cooking ideas. ? Be sure to use moist methods of cooking. Avoid microwaving meats because it can dry out the food making it harder to tolerate. Soft Mushy Diet: Phase 2  Time Meal or Snack Soft/Mushy Food Amount (ounces) Protein  (g) Supplement   6:30 am Sip on Fluids Sip on non-carbonated, calorie-free, no sugar added liquids.  8 oz   0 g Take Multivitamin containing 18 mg ferrous sulfate (iron)    7:00 am   Stop drinking fluids 30 minutes before breakfast   7:30 am Breakfast ¼ cup soft scrambled egg or egg substitute   ¼ cup canned fruit (packed in natural juice, strained)  4 oz    7 g    9:00 Snack  (optional) ½ cup low-fat or fat-free yogurt   or   ½ cup cottage cheese  4oz 4g  or  14 g    11:30 am Stop drinking liquids 30 minutes before lunch   12:00 pm Lunch ¼ cup low-fat or lean deli meat  with  ¼ well cooked green beans 4 oz  14 g Take 400 mg calcium citrate   2:00 Snack  (optional) ½ cup high protein, sugar-free pudding with 1 scoop added protein powder (see recipe in book). 4 oz 14 g      3:00 - 5:30 pm   Sip on Fluids   Sip on non-carbonated, calorie-free, no sugar added liquids. 24 - 32 oz   0 g   Take 400 mg calcium citrate. 6:00 pm Dinner ¼ cup soft/flaky fish (tilapia, flounder, tuna)  ¼ cup mashed potatoes or pureed cauliflower mashed potatoes (consider adding protein powder)  4 oz 14 g Take 400 mg of calcium citrate.    7:00 - 10:00 pm Sip on Fluids Sip on non-carbonated, no sugar added liquids as needed  16-24 oz 0 g Take Multivitamin with 18 mg ferrous sulfate   Total:  64 oz fluids 63  grams 2 Multivitamins with 18 mg ferrous sulfate, 6165-3902 mg calcium citrate

## 2018-08-13 NOTE — PROGRESS NOTES
1. Have you been to the ER, urgent care clinic since your last visit? Hospitalized since your last visit? No    2. Have you seen or consulted any other health care providers outside of the [address] [place] [address] since your last visit? Include any pap smears or colon screening. Yes-PCP routine care    [address] [place] [address]  Body composition    male  56 y.o. Vitals:    08/13/18 0931   BP: 110/80   Pulse: 95   Resp: 18   Temp: 99.1 °F (37.3 °C)   TempSrc: Oral   SpO2: 98%   Weight: 256 lb 8 oz (116.3 kg)   Height: 5' 9\" (1.753 m)     Body mass index is 37.88 kg/(m^2). H&P on 7/3/18 weight was 294 pounds  Last office visit on 7/30/18 weight was 264.5 pounds.

## 2018-08-13 NOTE — MR AVS SNAPSHOT
[address] [facility] [address] [facility] [place] [address] [address] 
[phone] Patient: John Penn MRN: [id number] APM:[birth year] Visit Information Date & Time Provider Department Dept. Phone Encounter #  
 8/13/2018  9:20 AM Kary Musa NP [facility] 22 [phone] [id number] Your Appointments 8/27/2018  9:20 AM  
POST OP 10 MIN with Kary Musa NP  
[facility] 22 095 ([address]) Appt Note: PO 6 weeks follow up, 7-16-18 BC: Lap TAKE-DOWN NISSEN/Lap GASTRIC BYPASS/Lap HIATAL HERNIA REPAIR  
 [address] [facility] [address]  
[address] [place] [address] Upcoming Health Maintenance Date Due DTaP/Tdap/Td series (1 - Tdap) 9/14/1992 Influenza Age 5 to Adult 8/1/2018 Allergies as of 8/13/2018  Review Complete On: 8/13/2018 By: Salas Cardona LPN No Known Allergies Current Immunizations  Never Reviewed No immunizations on file. Not reviewed this visit You Were Diagnosed With   
  
 Codes Comments Morbid obesity ([facility]    -  Primary ICD-10-CM: E66.01 
ICD-9-CM: 278.01   
 BMI 37.0-37.9, adult     ICD-10-CM: C45.97 ICD-9-CM: V85.37 Surgery follow-up     ICD-10-CM: [facility] ICD-9-CM: V67.00 Vitals BP Pulse Temp Resp Height(growth percentile) Weight(growth percentile) 110/80 (BP 1 Location: Right arm, BP Patient Position: Sitting) 95 99.1 °F (37.3 °C) (Oral) 18 5' 9\" (1.753 m) 256 lb 8 oz (116.3 kg) SpO2 BMI Smoking Status 98% 37.88 kg/m2 Never Smoker BMI and BSA Data Body Mass Index Body Surface Area  
 37.88 kg/m 2 2.38 m 2 Preferred Pharmacy Pharmacy Name Phone [id number] [address] [place] [address] Your Updated Medication List  
  
   
 This list is accurate as of 8/13/18  9:55 AM.  Always use your most recent med list.  
  
  
  
  
 allopurinol 300 mg tablet Commonly known as:  Rocco Villaseñor Take 300 mg by mouth daily. amLODIPine 10 mg tablet Commonly known as:  Kavehkatie Gonzales Take 10 mg by mouth daily. CALCIUM CITRATE PO Take 1 Tab by mouth two (2) times a day. Dexlansoprazole 60 mg Cpdb Take 60 mg by mouth.  
  
 ergocalciferol 50,000 unit capsule Commonly known as:  ERGOCALCIFEROL Take 1 Cap by mouth every seven (7) days. HYDROmorphone 2 mg tablet Commonly known as:  DILAUDID Take 1-2 Tabs by mouth every four (4) hours as needed. Max Daily Amount: 24 mg. Iron 325 mg (65 mg iron) tablet Generic drug:  ferrous sulfate Take 325 mg by mouth Daily (before breakfast). labetalol 100 mg tablet Commonly known as:  Larna Canes Take 300 mg by mouth two (2) times a day. lisinopril 40 mg tablet Commonly known as:  Robertha Roup Take 40 mg by mouth daily. multivitamin tablet Commonly known as:  ONE A DAY Take 1 Tab by mouth two (2) times a day. NASCOBAL 500 mcg/spray Spry Generic drug:  cyanocobalamin  
  
 ondansetron 4 mg disintegrating tablet Commonly known as:  ZOFRAN ODT Take 1 Tab by mouth every eight (8) hours as needed for Nausea. sertraline 100 mg tablet Commonly known as:  ZOLOFT Take 100 mg by mouth daily. simvastatin 10 mg tablet Commonly known as:  ZOCOR Take 10 mg by mouth daily. traZODone 100 mg tablet Commonly known as:  Sethi Tl Take 100 mg by mouth nightly. Patient Instructions Constipation: Care Instructions Your Care Instructions Constipation means that you have a hard time passing stools (bowel movements). People pass stools from 3 times a day to once every 3 days. What is normal for you may be different.  Constipation may occur with pain in the rectum and cramping. The pain may get worse when you try to pass stools. Sometimes there are small amounts of bright red blood on toilet paper or the surface of stools. This is because of enlarged veins near the rectum (hemorrhoids). A few changes in your diet and lifestyle may help you avoid ongoing constipation. Your doctor may also prescribe medicine to help loosen your stool. Some medicines can cause constipation. These include pain medicines and antidepressants. Tell your doctor about all the medicines you take. Your doctor may want to make a medicine change to ease your symptoms. Follow-up care is a key part of your treatment and safety. Be sure to make and go to all appointments, and call your doctor if you are having problems. It's also a good idea to know your test results and keep a list of the medicines you take. How can you care for yourself at home? · Drink plenty of fluids, enough so that your urine is light yellow or clear like water. If you have kidney, heart, or liver disease and have to limit fluids, talk with your doctor before you increase the amount of fluids you drink. · Include high-fiber foods in your diet each day. These include fruits, vegetables, beans, and whole grains. · Get at least 30 minutes of exercise on most days of the week. Walking is a good choice. You also may want to do other activities, such as running, swimming, cycling, or playing tennis or team sports. · Take a fiber supplement, such as Citrucel or Metamucil, every day. Read and follow all instructions on the label. · Schedule time each day for a bowel movement. A daily routine may help. Take your time having your bowel movement. · Support your feet with a small step stool when you sit on the toilet. This helps flex your hips and places your pelvis in a squatting position.  
· Your doctor may recommend an over-the-counter laxative to relieve your constipation. Examples are Milk of Magnesia and MiraLax. Read and follow all instructions on the label. Do not use laxatives on a long-term basis. When should you call for help? Call your doctor now or seek immediate medical care if: 
  · You have new or worse belly pain.  
  · You have new or worse nausea or vomiting.  
  · You have blood in your stools.  
 Watch closely for changes in your health, and be sure to contact your doctor if: 
  · Your constipation is getting worse.  
  · You do not get better as expected. Where can you learn more? Go to http://mervinStackMobashish.info/. Enter 21  in the search box to learn more about \"Constipation: Care Instructions. \" Current as of: November 20, 2017 Content Version: 11.7 © 4775-0443 [employer]. Care instructions adapted under license by [employer] (which disclaims liability or warranty for this information). If you have questions about a medical condition or this instruction, always ask your healthcare professional. [place] [address] any warranty or liability for your use of this information. What you need to know: 
1 . Advance your diet to moist meats. Follow the handout that you were given today in the office. 2. Take the recommended vitamins daily 3 No lifting greater than 40 lbs. 4. You can do light jogging, moderate walking and a recumbent bike. 5 Follow up in 2 weeks. 6. You may go into a pool. 7. If you are not able to tolerate liquids, soft foods or moist meats. Please call our office. [phone] 
8. If you have vomiting and persistent epigastric pain or chest pain. You should call our office, the doctor on-call or go to the emergency room. What to do if you are constipated: You may  take Milk of Magnesia. Take 2 Tablespoons followed by 16 oz of water then 2 hours later take another 2 tablespoons.  If  milk of magnesia does not work then take [place] or Miralax over the counter. Keep in mind that the Benefiber or Miralax may take a day or two to work. If all of the above do not work try a Fleets enema and follow the directions on the box. Shopping List [place]o Soft Mushy Diet: Phase 2 - Moist Meats Below is a list of moist meats that you can now introduce into your diet. Moist Meats: Prepare food to the appropriate texture using low-fat cooking  
methods ? Tuna packed in water (strain before eating) ? White flaky fish (grace, cod, flounder, tilapia, salmon) ? White chicken breast packed in water (strain before eating) ? 96-99% fat free thinly sliced deli meat (ham, turkey, roast beef) ? Fat free non-stick spray ? Silken Tofu ? Low-fat or vegetarian refried beans ? Well-cooked beans and lentils ? Skinless turkey or chicken (prepare to a soft texture) ? 93% lean pureed beef (round or sirloin only) ? Lean pork (cooked until very tender, cut into small pieces) ? Eggs (preferable egg whites) ? Egg substitutes ? Herbs and spices ? Lite butter, margarine, canola oil, olive oil, reduced-fat or fat-free hoffman, reduced-fat or fat-free salad dressing, reduced-fat or fat-free cream cheese, reduced-fat or fat-free sour cream, lemon juice, salt, pepper, mustard, ketchup, salsa. See patient handbook for more low-fat cooking ideas. ? Be sure to use moist methods of cooking. Avoid microwaving meats because it can dry out the food making it harder to tolerate. Soft Mushy Diet: Phase 2 Time Meal or Snack Soft/Mushy Food Amount (ounces) Protein 
(g) Supplement 6:30 am Sip on Fluids Sip on non-carbonated, calorie-free, no sugar added liquids. 8 oz 
 0 g Take Multivitamin containing 18 mg ferrous sulfate (iron) 7:00 am   Stop drinking fluids 30 minutes before breakfast  
7:30 am Breakfast ¼ cup soft scrambled egg or egg substitute ¼ cup canned fruit (packed in natural juice, strained)  4 oz  
 7 g   
9:00 Snack (optional) ½ cup low-fat or fat-free yogurt  
or ½ cup cottage cheese  4oz 4g 
or 14 g   
11:30 am Stop drinking liquids 30 minutes before lunch 12:00 pm Lunch ¼ cup low-fat or lean deli meat 
with ¼ well cooked green beans 4 oz  14 g Take 400 mg calcium citrate 2:00 Snack (optional) ½ cup high protein, sugar-free pudding with 1 scoop added protein powder (see recipe in book). 4 oz 14 g   
 
3:00  5:30 pm  
Sip on Fluids Sip on non-carbonated, calorie-free, no sugar added liquids. 24 - 32 oz  
0 g Take 400 mg calcium citrate. 6:00 pm Dinner ¼ cup soft/flaky fish (tilapia, flounder, tuna) ¼ cup mashed potatoes or pureed cauliflower mashed potatoes (consider adding protein powder)  4 oz 14 g Take 400 mg of calcium citrate. 7:00 - 10:00 pm Sip on Fluids Sip on non-carbonated, no sugar added liquids as needed  16-24 oz 0 g Take Multivitamin with 18 mg ferrous sulfate Total:  64 oz fluids 63 
grams 2 Multivitamins with 18 mg ferrous sulfate, 0863-5776 mg calcium citrate Introducing [facility]! Dear Rebeca Valenzuela: Thank you for requesting a Lightbox account. Our records indicate that you already have an active Lightbox account. You can access your account anytime at [email or web address] Did you know that you can access your hospital and ER discharge instructions at any time in Lightbox? You can also review all of your test results from your hospital stay or ER visit. Additional Information If you have questions, please visit the Frequently Asked Questions section of the Lightbox website at [email or web address] Remember, Lightbox is NOT to be used for urgent needs. For medical emergencies, dial 911. Now available from your iPhone and Android! Please provide this summary of care documentation to your next provider. Your primary care clinician is listed as Phys Other. If you have any questions after today's visit, please call [phone].

## 2018-08-13 NOTE — PROGRESS NOTES
4 weeks S/P Lap Gastric Bypass with takedown nissen fundoplication  Pt reports doing well on liquids and soft foods. .    Pt's post op pain is none. Pt reports no nausea and no vomiting  Heis drinking approximately 64 oz of water daily  He is back on the HCTZ  He is eating 50-60 grams of protein daily. +Bm      She is taking bariatric vitamins without issue. Total weight loss since surgery 38lbs  Weight loss since last visit 8.5 lbs  Visit Vitals    /80 (BP 1 Location: Right arm, BP Patient Position: Sitting)    Pulse 95    Temp 99.1 °F (37.3 °C) (Oral)    Resp 18    Ht 5' 9\" (1.753 m)    Wt 256 lb 8 oz (116.3 kg)    SpO2 98%    BMI 37.88 kg/m2          Patient has an advanced directive: not on file. Mr. Cristina Valencia has a reminder for a \"due or due soon\" health maintenance. I have asked that he contact his primary care provider for follow-up on this health maintenance. Physical Examination: General appearance - alert, well appearing, and in no distress,  Chest - clear to auscultation bilaterally  Heart - normal rate, regular rhythm, normal S1, S2, no murmurs, rubs, clicks or gallops  Abdomen - soft, nontender, nondistended  scars from previous incisions healing without erythema or induration    A/P    Doing well   4 weeks S/P Lap Gastric Bypass with takedown nissen fundoplication  Diet advanced to soft meats. Focus on 50-60 grams of protein daily. Encouraged water intake  No lifting greater than 40 lbs. Follow up in 2 weeks. Pt verbalized understanding and questions were answered to the best of my knowledge and ability.             Naima Villegas NP

## 2018-08-27 ENCOUNTER — OFFICE VISIT (OUTPATIENT)
Dept: SURGERY | Age: 48
End: 2018-08-27

## 2018-08-27 VITALS
HEIGHT: 69 IN | OXYGEN SATURATION: 99 % | SYSTOLIC BLOOD PRESSURE: 122 MMHG | BODY MASS INDEX: 37.62 KG/M2 | RESPIRATION RATE: 18 BRPM | WEIGHT: 254 LBS | DIASTOLIC BLOOD PRESSURE: 74 MMHG | HEART RATE: 93 BPM

## 2018-08-27 DIAGNOSIS — Z09 SURGERY FOLLOW-UP: ICD-10-CM

## 2018-08-27 DIAGNOSIS — E66.01 MORBID OBESITY (HCC): Primary | ICD-10-CM

## 2018-08-27 NOTE — PATIENT INSTRUCTIONS

## 2018-08-27 NOTE — PROGRESS NOTES
6 weeks status post Lap Gastric bypass with takedown of Nissen Fundoplication. Pt reports doing well on liquids, soft foods and soft meats. .    Pt's post op pain is none. Pt reports no nausea and no vomiting  She is drinking approximately 64 oz of water daily.   + Bm  He is eating 50-60 grams of protein daily. She is taking bariatric vitamins without issue. Total weight loss since surgery  40lbs  Weight loss since last visit 2.5 lbs  Visit Vitals    /74 (BP 1 Location: Right arm, BP Patient Position: Sitting)    Pulse 93    Resp 18    Ht 5' 9\" (1.753 m)    Wt 254 lb (115.2 kg)    SpO2 99%    BMI 37.51 kg/m2          Patient has an advanced directive: not on file. Mr. Cyrus Zaragoza has a reminder for a \"due or due soon\" health maintenance. I have asked that he contact his primary care provider for follow-up on this health maintenance. Physical Examination: General appearance - alert, well appearing, and in no distress,  Chest - clear to auscultation bilaterally  Heart - normal rate, regular rhythm, normal S1, S2, no murmurs, rubs, clicks or gallops  Abdomen - soft, nontender, nondistended  scars from previous incisions healing without erythema or induration    A/P    Doing well 4 wks stats post laparoscopic Gastric Bypass  Advised patient regard to diet that is high-protein, low-fat, low-sugar, limited carbohydrates. Strive for 50-60 grams of protein daily. If having a snack, foods that are protein or fiber rich. . No eating/drinking together, chew foods well, and portion control. Measure meals. Discussed snacking behavior and to [facility] pay attention to behavioral factor and habits. Drink at least 40-64 ounces of water or non-calorie/non-carbonated beverages daily. Continue vitamin regiment daily. Exercise at least 3 days a week with cardiovascular and strength training. Patient to follow up in 6 weeks Advised to call office if any questions/concerns.    Pt verbalized understanding and questions were answered to the best of my knowledge and ability.         Cindy De Souza, NP

## 2018-08-27 NOTE — MR AVS SNAPSHOT
[address] [place] [address] [facility] [address] [id number] [phone] Patient: Denver Conradi MRN: [id number] IQB:6/80/[id number] Visit Information Date & Time Provider Department Dept. Phone Encounter #  
 8/27/2018  9:20 AM Marcelino Fu NP [facility] 22 671 [phone] [id number] Your Appointments 10/8/2018  9:40 AM  
POST OP 10 MIN with Marcelino Fu NP  
[facility] [address] ([address]) Appt Note: PO; 6wk. F/U w/Laura; [facility]; 8/27/18  
 [address] [facility] [address]  
[facility] Upcoming Health Maintenance Date Due DTaP/Tdap/Td series (1 - Tdap) 9/14/1992 Influenza Age 5 to Adult 8/1/2018 Allergies as of 8/27/2018  Review Complete On: 8/27/2018 By: Paris Ferraro LPN No Known Allergies Current Immunizations  Never Reviewed No immunizations on file. Not reviewed this visit Vitals BP Pulse Resp Height(growth percentile) Weight(growth percentile) SpO2  
 122/74 (BP 1 Location: Right arm, BP Patient Position: Sitting) 93 18 5' 9\" (1.753 m) 254 lb (115.2 kg) 99% BMI Smoking Status 37.51 kg/m2 Never Smoker BMI and BSA Data Body Mass Index Body Surface Area  
 37.51 kg/m 2 2.37 m 2 Preferred Pharmacy Pharmacy Name Phone [id number] Khang Esteves [address] Your Updated Medication List  
  
   
This list is accurate as of 8/27/18  9:50 AM.  Always use your most recent med list.  
  
  
  
  
 allopurinol 300 mg tablet Commonly known as:  Renetta Dakins Take 300 mg by mouth daily. amLODIPine 10 mg tablet Commonly known as:  Daren Mckeon Take 10 mg by mouth daily. CALCIUM CITRATE PO Take 1 Tab by mouth two (2) times a day. Dexlansoprazole 60 mg Cpdb Take 60 mg by mouth.  
  
 ergocalciferol 50,000 unit capsule Commonly known as:  ERGOCALCIFEROL Take 1 Cap by mouth every seven (7) days. Iron 325 mg (65 mg iron) tablet Generic drug:  ferrous sulfate Take 325 mg by mouth Daily (before breakfast). labetalol 100 mg tablet Commonly known as:  Lorayne Ludin Take 300 mg by mouth two (2) times a day. lisinopril 40 mg tablet Commonly known as:  Chloé Pinch Take 40 mg by mouth daily. multivitamin tablet Commonly known as:  ONE A DAY Take 1 Tab by mouth two (2) times a day. NASCOBAL 500 mcg/spray Spry Generic drug:  cyanocobalamin 1 Spray every seven (7) days. ondansetron 4 mg disintegrating tablet Commonly known as:  ZOFRAN ODT Take 1 Tab by mouth every eight (8) hours as needed for Nausea. sertraline 100 mg tablet Commonly known as:  ZOLOFT Take 100 mg by mouth daily. simvastatin 10 mg tablet Commonly known as:  ZOCOR Take 10 mg by mouth daily. traZODone 100 mg tablet Commonly known as:  Manuel Awoctavianoward Take 100 mg by mouth nightly. Patient Instructions Learning About Physical Activity What is physical activity? Physical activity is any kind of activity that gets your body moving. The types of physical activity that can help you get fit and stay healthy include: · Aerobic or \"cardio\" activities that make your heart beat faster and make you breathe harder, such as brisk walking, riding a bike, or running. Aerobic activities strengthen your heart and lungs and build up your endurance. · Strength training activities that make your muscles work against, or \"resist,\" something, such as lifting weights or doing push-ups. These activities help tone and strengthen your muscles. · Stretches that allow you to move your joints and muscles through their full range of motion. Stretching helps you be more flexible and avoid injury. What are the benefits of physical activity? Being active is one of the best things you can do to get fit and stay healthy. It helps you to: · Feel stronger and have more energy to do all the things you like to do. · Focus better at school or work and perform better in sports. · Feel, think, and sleep better. · Reach and stay at a healthy weight. · Lose fat and build lean muscle. · Lower your risk for serious health problems. · Keep your bones, muscles, and joints strong. Being fit lets you do more physical activity. And it lets you work out harder without as much effort. How can you make physical activity part of your life? Get at least 30 minutes of exercise on most days of the week. Walking is a good choice. You also may want to do other activities, such as running, swimming, cycling, or playing tennis or team sports. Pick activities that you like-ones that make your heart beat faster, your muscles stronger, and your muscles and joints more flexible. If you find more than one thing you like doing, do them all. You don't have to do the same thing every day. Get your heart pumping every day. Any activity that makes your heart beat faster and keeps it at that rate for a while counts. Here are some great ways to get your heart beating faster: · Go for a brisk walk, run, or bike ride. · Go for a hike or swim. · Go in-line skating. · Play a game of touch football, basketball, or soccer. · Ride a bike. · Play tennis or racquetball. · Climb stairs. Even some household chores can be aerobic-just do them at a faster pace. Vacuuming, raking or mowing the lawn, sweeping the garage, and washing and waxing the car all can help get your heart rate up. Strengthen your muscles during the week. You don't have to lift heavy weights or grow big, bulky muscles to get stronger. Doing a few simple activities that make your muscles work against, or \"resist,\" something can help you get stronger. For example, you can: · Do push-ups or sit-ups, which use your own body weight as resistance. · Lift weights or dumbbells or use stretch bands at home or in a gym or community center. Stretch your muscles often. Stretching will help you as you become more active. It can help you stay flexible, loosen tight muscles, and avoid injury. It can also help improve your balance and posture and can be a great way to relax. Be sure to stretch the muscles you'll be using when you work out. It's best to warm your muscles slightly before you stretch them. Walk or do some other light aerobic activity for a few minutes, and then start stretching. When you stretch your muscles: · Do it slowly. Stretching is not about going fast or making sudden movements. · Don't push or bounce during a stretch. · Hold each stretch for at least 15 to 30 seconds, if you can. You should feel a stretch in the muscle, but not pain. · Breathe out as you do the stretch. Then breathe in as you hold the stretch. Don't hold your breath. If you're worried about how more activity might affect your health, have a checkup before you start. Follow any special advice your doctor gives you for getting a smart start. Where can you learn more? Go to http://mervin-ashish.info/. Enter A099 in the search box to learn more about \"Learning About Physical Activity. \" Current as of: December 7, 2017 Content Version: 11.7 © 9606-8380 Instant Information. Care instructions adapted under license by appening (which disclaims liability or warranty for this information). If you have questions about a medical condition or this instruction, always ask your healthcare professional. [address] any warranty or liability for your use of this information. Introducing [facility] HE[facility]! Dear Robe Martell: Thank you for requesting a [employer] account.   Our records indicate that you already have an active [employer] account. You can access your account anytime at [email or web address] Did you know that you can access your hospital and ER discharge instructions at any time in [employer]? You can also review all of your test results from your hospital stay or ER visit. Additional Information If you have questions, please visit the Frequently Asked Questions section of the [employer] website at [email or web address] Remember, [employer] is NOT to be used for urgent needs. For medical emergencies, dial 911. Now available from your iPhone and Android! Please provide this summary of care documentation to your next provider. Your primary care clinician is listed as Phys Other. If you have any questions after today's visit, please call [phone].

## 2018-08-27 NOTE — PROGRESS NOTES
Follow up for lap takedown Nissen on 7/16/18. 1. Have you been to the ER, urgent care clinic since your last visit? Hospitalized since your last visit? No    2. Have you seen or consulted any other health care providers outside of the [address] [place] [address] since your last visit? Include any pap smears or colon screening.  No      Weight loss since surgery:  40 lbs  Weight loss since last OV:  2.5 lbs

## 2018-10-08 ENCOUNTER — OFFICE VISIT (OUTPATIENT)
Dept: SURGERY | Age: 48
End: 2018-10-08

## 2018-10-08 VITALS
WEIGHT: 247 LBS | TEMPERATURE: 98.1 F | BODY MASS INDEX: 36.58 KG/M2 | SYSTOLIC BLOOD PRESSURE: 115 MMHG | OXYGEN SATURATION: 98 % | RESPIRATION RATE: 18 BRPM | DIASTOLIC BLOOD PRESSURE: 83 MMHG | HEART RATE: 86 BPM | HEIGHT: 69 IN

## 2018-10-08 DIAGNOSIS — Z09 SURGERY FOLLOW-UP: ICD-10-CM

## 2018-10-08 DIAGNOSIS — E66.01 MORBID OBESITY (HCC): Primary | ICD-10-CM

## 2018-10-08 NOTE — PATIENT INSTRUCTIONS

## 2018-10-08 NOTE — PROGRESS NOTES
1. Have you been to the ER, urgent care clinic since your last visit? Hospitalized since your last visit? No    2. Have you seen or consulted any other health care providers outside of the [address] [place] [address] since your last visit? Include any pap smears or colon screening.  No

## 2018-10-08 NOTE — PROGRESS NOTES
HISTORY OF PRESENT ILLNESS  [facility] Marcela Smith is a 53 y.o. male with previous Lap Gastric bypass with takedown of Nissen Fundoplication and partial gastrectomy  . He has lost a total of 47 pounds since surgery. He  Has lost 7 lbs  since the last ov. Body mass index is 36.48 kg/(m^2). . no nausea and no vomiting . Denies Acid reflux/heartburn. . Drinking  64 ounces of water daily. 50-60 grams protein intake daily. + BM's. He has some back pain and shoulder pain. He has finished his Vit D prescription. Dietary recall -    Breakfast- yogurt or bar  Lunch- turkey sandwich. Dinner- fish and spinach    He is snacking between meals; fruit and cheese. .      Vitamins:  MVI : yes  Calcium : yes  B-Vit 12: yes    Patient has an advanced directive: not on file. Mr. Marcela Smith has a reminder for a \"due or due soon\" health maintenance. I have asked that he contact his primary care provider for follow-up on this health maintenance. Co-Morbid(s)          Was anti coagulation initiated for presumed / confirmed DVT/PE?    not on file. COMORBIDITY     SLEEP APNEA                 YES        GERD  (req.meds)            NO  HYPERLIPIDEMIA            YES  HYPERTENSION              Yes         DIABETES                         NO           Current Outpatient Prescriptions:     NASCOBAL 500 mcg/spray spry, 1 Spray every seven (7) days. , Disp: , Rfl:     CALCIUM CITRATE PO, Take 1 Tab by mouth two (2) times a day., Disp: , Rfl:     ferrous sulfate (IRON) 325 mg (65 mg iron) tablet, Take 325 mg by mouth Daily (before breakfast). , Disp: , Rfl:     multivitamin (ONE A DAY) tablet, Take 1 Tab by mouth two (2) times a day., Disp: , Rfl:     lisinopril (PRINIVIL, ZESTRIL) 40 mg tablet, Take 40 mg by mouth daily. , Disp: , Rfl:     amLODIPine (NORVASC) 10 mg tablet, Take 10 mg by mouth daily. , Disp: , Rfl:     labetalol (NORMODYNE) 100 mg tablet, Take 300 mg by mouth two (2) times a day., Disp: , Rfl:     allopurinol (ZYLOPRIM) 300 mg tablet, Take 300 mg by mouth daily. , Disp: , Rfl:     simvastatin (ZOCOR) 10 mg tablet, Take 10 mg by mouth daily. , Disp: , Rfl:     sertraline (ZOLOFT) 100 mg tablet, Take 100 mg by mouth daily. , Disp: , Rfl:     traZODone (DESYREL) 100 mg tablet, Take 100 mg by mouth nightly., Disp: , Rfl:     ergocalciferol (ERGOCALCIFEROL) 50,000 unit capsule, Take 1 Cap by mouth every seven (7) days. , Disp: 12 Cap, Rfl: 0    ondansetron (ZOFRAN ODT) 4 mg disintegrating tablet, Take 1 Tab by mouth every eight (8) hours as needed for Nausea., Disp: 30 Tab, Rfl: 0    Dexlansoprazole 60 mg CpDB, Take 60 mg by mouth., Disp: , Rfl:       Visit Vitals    /83 (BP 1 Location: Left arm, BP Patient Position: Sitting)    Pulse 86    Temp 98.1 °F (36.7 °C) (Oral)    Resp 18    Ht 5' 9\" (1.753 m)    Wt 247 lb (112 kg)    SpO2 98%    BMI 36.48 kg/m2     HPI    Review of Systems   Constitutional: Negative for chills and fever. Respiratory: Negative for shortness of breath. Cardiovascular: Negative for chest pain. Gastrointestinal: Negative for abdominal pain, heartburn, nausea and vomiting. Musculoskeletal: Positive for back pain. Neurological: Negative for dizziness and headaches. Physical Exam   Constitutional: He is oriented to person, place, and time. He appears well-developed and well-nourished. Cardiovascular: Normal rate, regular rhythm and normal heart sounds. Pulmonary/Chest: Effort normal and breath sounds normal.   Abdominal: Soft. Bowel sounds are normal. He exhibits no distension. There is no tenderness. No masses or hernias noted. Musculoskeletal: Normal range of motion. Neurological: He is alert and oriented to person, place, and time. Skin: Skin is warm and dry. Psychiatric: He has a normal mood and affect. ASSESSMENT and PLAN  Triston Zaldivar is a 53 y.o. male with previous Lap Gastric bypass with takedown of Nissen Fundoplication and partial gastrectomy  .  He has lost a total of 47 pounds since surgery. He  Has lost 7 lbs  since the last ov. Body mass index is 36.48 kg/(m^2). Bernard Yousif Advised patient regard to diet that is high-protein, low-fat, low-sugar, limited carbohydrates. Strive for 50-60 grams of protein daily. If having a snack, foods that are protein or fiber rich. . No eating/drinking together, chew foods well, and portion control. Measure meals. Discussed snacking behavior and to [employer] pay attention to behavioral factor and habits. Drink at least 40-64 ounces of water or non-calorie/non-carbonated beverages daily. Continue vitamin regiment daily. Exercise when able. Patient to follow up in 3 months. Advised to call office if any questions/concerns. Advised to start Vit D 5,000 iu daily. Will recheck labs in 3 months. Pt verbalized understanding and questions were answered to the best of my knowledge and ability.

## 2019-01-28 ENCOUNTER — OFFICE VISIT (OUTPATIENT)
Dept: SURGERY | Age: 49
End: 2019-01-28

## 2019-01-28 VITALS
OXYGEN SATURATION: 96 % | HEART RATE: 118 BPM | WEIGHT: 242.8 LBS | TEMPERATURE: 99.2 F | HEIGHT: 69 IN | BODY MASS INDEX: 35.96 KG/M2 | SYSTOLIC BLOOD PRESSURE: 132 MMHG | DIASTOLIC BLOOD PRESSURE: 96 MMHG

## 2019-01-28 DIAGNOSIS — E66.01 MORBID OBESITY (HCC): Primary | ICD-10-CM

## 2019-01-28 RX ORDER — SPIRONOLACTONE 25 MG/1
TABLET ORAL DAILY
COMMUNITY

## 2019-01-28 NOTE — PROGRESS NOTES
1. Have you been to the ER, urgent care clinic since your last visit? Hospitalized since your last visit? No    2. Have you seen or consulted any other health care providers outside of the [address] [place] [address] since your last visit? Include any pap smears or colon screening.  PCP [facility]

## 2019-01-28 NOTE — PROGRESS NOTES
HISTORY OF PRESENT ILLNESS  Derrek Foley is a 53 y.o. male with previous Lap Gastric bypass with Takedown of Nissen fundoplication and partial Gastrectomy surgery on 7/16/2018 . He has lost a total of 51.2 pounds since surgery. He  Has lost 4.2 since the last ov. Body mass index is 35.86 kg/m². . Sometimes he is nauseated with post nasal drip. Takes Mucinex, with relief. . denies Acid reflux/heartburn. . Drinking  64+ ounces of water daily. %0= protein intake daily. + BM's. Pt is exercising, he is limited due injuries. Dietary recall -    Breakfast- yogurt, pineapple, scrambled eggs  Lunch- 1/2 tuna sandwich  Dinner- chicken and rice    He is snacking between meals; rtiz crackers, fritos, 100 adam. .      Vitamins:  MVI : yes  Calcium : yes  B-Vit 12: yes    Patient has an advanced directive: NOt on file. Mr. Delonte Foley has a reminder for a \"due or due soon\" health maintenance. I have asked that he contact his primary care provider for follow-up on this health maintenance. Co-Morbid(s)              COMORBIDITY     SLEEP APNEA                 YES        GERD  (req.meds)            NO  HYPERLIPIDEMIA            YES  HYPERTENSION              YES         DIABETES                         NO           Current Outpatient Medications:     spironolactone (ALDACTONE) 25 mg tablet, Take  by mouth daily. , Disp: , Rfl:     NASCOBAL 500 mcg/spray spry, 1 Spray every seven (7) days. , Disp: , Rfl:     CALCIUM CITRATE PO, Take 1 Tab by mouth two (2) times a day., Disp: , Rfl:     ferrous sulfate (IRON) 325 mg (65 mg iron) tablet, Take 325 mg by mouth Daily (before breakfast). , Disp: , Rfl:     multivitamin (ONE A DAY) tablet, Take 1 Tab by mouth two (2) times a day., Disp: , Rfl:     lisinopril (PRINIVIL, ZESTRIL) 40 mg tablet, Take 40 mg by mouth daily. , Disp: , Rfl:     amLODIPine (NORVASC) 10 mg tablet, Take 10 mg by mouth daily. , Disp: , Rfl:     ergocalciferol (ERGOCALCIFEROL) 50,000 unit capsule, Take 1 Cap by mouth every seven (7) days. , Disp: 12 Cap, Rfl: 0    labetalol (NORMODYNE) 100 mg tablet, Take 300 mg by mouth two (2) times a day., Disp: , Rfl:     allopurinol (ZYLOPRIM) 300 mg tablet, Take 300 mg by mouth daily. , Disp: , Rfl:     simvastatin (ZOCOR) 10 mg tablet, Take 10 mg by mouth daily. , Disp: , Rfl:     sertraline (ZOLOFT) 100 mg tablet, Take 100 mg by mouth daily. , Disp: , Rfl:     traZODone (DESYREL) 100 mg tablet, Take 100 mg by mouth nightly., Disp: , Rfl:     ondansetron (ZOFRAN ODT) 4 mg disintegrating tablet, Take 1 Tab by mouth every eight (8) hours as needed for Nausea., Disp: 30 Tab, Rfl: 0    Dexlansoprazole 60 mg CpDB, Take 60 mg by mouth., Disp: , Rfl:       Visit Vitals  BP (!) 132/96 (BP 1 Location: Left arm, BP Patient Position: Sitting)   Pulse (!) 118   Temp 99.2 °F (37.3 °C) (Oral)   Ht 5' 9\" (1.753 m)   Wt 242 lb 12.8 oz (110.1 kg)   SpO2 96%   BMI 35.86 kg/m²     HPI    Review of Systems   Constitutional: Negative for chills and fever. Respiratory: Negative for cough. Cardiovascular: Negative for chest pain. Gastrointestinal: Negative for abdominal pain, heartburn, nausea and vomiting. Musculoskeletal: Positive for neck pain. Physical Exam   Constitutional: He is oriented to person, place, and time. He appears well-developed and well-nourished. Cardiovascular: Normal rate, regular rhythm and normal heart sounds. Pulmonary/Chest: Effort normal and breath sounds normal.   Abdominal: Soft. Bowel sounds are normal. He exhibits no distension. There is no tenderness. No masses or hernias noted. Musculoskeletal: Normal range of motion. Neurological: He is alert and oriented to person, place, and time. Skin: Skin is warm and dry. Psychiatric: He has a normal mood and affect. ASSESSMENT and PLAN  Cristina Guzmán is a 53 y.o. male with previous Lap Gastric bypass with Takedown of Nissen fundoplication and partial Gastrectomy surgery on 7/16/2018 .  He has lost a total of 51.2 pounds since surgery. He  Has lost 4.2 since the last ov. Body mass index is 35.86 kg/m². Con De La Cruz Advised patient regard to diet that is high-protein, low-fat, low-sugar, limited carbohydrates. Strive for 50-60 grams of protein daily. If having a snack, foods that are protein or fiber rich. . No eating/drinking together, chew foods well, and portion control. Measure meals. Discussed snacking behavior and to [facility] pay attention to behavioral factor and habits. Drink at least 40-64 ounces of water or non-calorie/non-carbonated beverages daily. Continue vitamin regiment daily. Exercise at least 3 days a week with cardiovascular and strength training. Patient to follow up in 3 months,. . Advised to call office if any questions/concerns. Pt verbalized understanding and questions were answered to the best of my knowledge and ability.

## 2019-04-29 ENCOUNTER — OFFICE VISIT (OUTPATIENT)
Dept: SURGERY | Age: 49
End: 2019-04-29

## 2019-04-29 VITALS
SYSTOLIC BLOOD PRESSURE: 120 MMHG | HEART RATE: 90 BPM | OXYGEN SATURATION: 98 % | TEMPERATURE: 98.6 F | RESPIRATION RATE: 20 BRPM | WEIGHT: 237 LBS | BODY MASS INDEX: 35.1 KG/M2 | HEIGHT: 69 IN | DIASTOLIC BLOOD PRESSURE: 90 MMHG

## 2019-04-29 DIAGNOSIS — E66.01 MORBID OBESITY (HCC): Primary | ICD-10-CM

## 2019-04-29 DIAGNOSIS — Z98.84 S/P GASTRIC BYPASS: ICD-10-CM

## 2019-04-29 RX ORDER — CHLORTHALIDONE 25 MG/1
TABLET ORAL DAILY
COMMUNITY

## 2019-04-29 NOTE — PROGRESS NOTES
HISTORY OF PRESENT ILLNESS  Wenita Graft Jovon Brizuela is a 53 y.o. male with previous Malabsorptive Gastric bypass surgery 9 months ago. Iman Gearing He has lost a total of 56.5 pounds since surgery. He  Has lost 5.5 lbs since the last ov. Body mass index is 35 kg/m². Iman Núñez no nausea and no vomiting . Denies Acid reflux/heartburn. . Drinking  50 +ounces of water daily. 60+  protein intake daily. + BM's. Pt is exercsing. He states that he had one episode of reactive hypoglyemia after eating a pancake with syrup. He had labs drawn at the [place] in January. He will bring them at the next appointment and we will review. Dietary recall -    Breakfast- shake  Lunch- chicken   Dinner- steak and salad  He is snacking between meals; nuts. Vitamins:  MVI : yes  Calcium : yes  B-Vit 12: yes    Patient has an advanced directive:  Not on file       Mr. Jovon Brizuela has a reminder for a \"due or due soon\" health maintenance. I have asked that he contact his primary care provider for follow-up on this health maintenance. Co-Morbid(s)             COMORBIDITY     SLEEP APNEA                 YES        GERD  (req.meds)            NO  HYPERLIPIDEMIA            YES  HYPERTENSION              YES         DIABETES                         NO           Current Outpatient Medications:     chlorthalidone (HYGROTEN) 25 mg tablet, Take  by mouth daily. , Disp: , Rfl:     spironolactone (ALDACTONE) 25 mg tablet, Take  by mouth daily. , Disp: , Rfl:     NASCOBAL 500 mcg/spray spry, 1 Spray every seven (7) days. , Disp: , Rfl:     CALCIUM CITRATE PO, Take 1 Tab by mouth two (2) times a day., Disp: , Rfl:     ferrous sulfate (IRON) 325 mg (65 mg iron) tablet, Take 325 mg by mouth Daily (before breakfast). , Disp: , Rfl:     multivitamin (ONE A DAY) tablet, Take 1 Tab by mouth two (2) times a day., Disp: , Rfl:     lisinopril (PRINIVIL, ZESTRIL) 40 mg tablet, Take 40 mg by mouth daily. , Disp: , Rfl:     amLODIPine (NORVASC) 10 mg tablet, Take 10 mg by mouth daily. , Disp: , Rfl:     ergocalciferol (ERGOCALCIFEROL) 50,000 unit capsule, Take 1 Cap by mouth every seven (7) days. , Disp: 12 Cap, Rfl: 0    ondansetron (ZOFRAN ODT) 4 mg disintegrating tablet, Take 1 Tab by mouth every eight (8) hours as needed for Nausea., Disp: 30 Tab, Rfl: 0    labetalol (NORMODYNE) 100 mg tablet, Take 300 mg by mouth two (2) times a day., Disp: , Rfl:     allopurinol (ZYLOPRIM) 300 mg tablet, Take 300 mg by mouth daily. , Disp: , Rfl:     simvastatin (ZOCOR) 10 mg tablet, Take 10 mg by mouth daily. , Disp: , Rfl:     traZODone (DESYREL) 100 mg tablet, Take 100 mg by mouth nightly., Disp: , Rfl:     Dexlansoprazole 60 mg CpDB, Take 60 mg by mouth., Disp: , Rfl:     sertraline (ZOLOFT) 100 mg tablet, Take 100 mg by mouth daily. , Disp: , Rfl:       Visit Vitals  /90   Pulse 90   Temp 98.6 °F (37 °C)   Resp 20   Ht 5' 9\" (1.753 m)   Wt 237 lb (107.5 kg)   SpO2 98%   BMI 35.00 kg/m²     HPI    Review of Systems   Constitutional: Negative for chills and fever. Respiratory: Negative for cough. Cardiovascular: Negative for chest pain. Gastrointestinal: Negative for abdominal pain, heartburn, nausea and vomiting. Neurological: Negative for dizziness and headaches. Physical Exam   Constitutional: He is oriented to person, place, and time. He appears well-developed and well-nourished. Cardiovascular: Normal rate, regular rhythm and normal heart sounds. Pulmonary/Chest: Effort normal and breath sounds normal.   Abdominal: Soft. Bowel sounds are normal. He exhibits no distension. There is no tenderness. No masses or hernias noted. Musculoskeletal: Normal range of motion. Neurological: He is alert and oriented to person, place, and time. Skin: Skin is warm and dry. Psychiatric: He has a normal mood and affect. ASSESSMENT and PLAN  Azalia Light is a 53 y.o. male with previous Malabsorptive Gastric bypass surgery 9 months ago. Zia Roblero  He has lost a total of 56.5 pounds since surgery. He  Has lost 5.5 lbs since the last ov. Body mass index is 35 kg/m². Antione Loja Advised patient regard to diet that is high-protein, low-fat, low-sugar, limited carbohydrates. Strive for 50-60 grams of protein daily. If having a snack, foods that are protein or fiber rich. . No eating/drinking together, chew foods well, and portion control. Measure meals. Discussed snacking behavior and to [facility] pay attention to behavioral factor and habits. Drink at least 40-64 ounces of water or non-calorie/non-carbonated beverages daily. Continue vitamin regiment daily. Exercise as tolerated. . Patient to follow up in 3 months. Advised to call office if any questions/concerns. Will check labs at next viist.   .Ptverbalized understanding and questions were answered to the best of my knowledge and ability. Antione Loja

## 2019-04-29 NOTE — PATIENT INSTRUCTIONS
Eating Healthy Foods: Care Instructions  Your Care Instructions    Eating healthy foods can help lower your risk for disease. Healthy food gives you energy and keeps your heart strong, your brain active, your muscles working, and your bones strong. A healthy diet includes a variety of foods from the basic food groups: grains, vegetables, fruits, milk and milk products, and meat and beans. Some people may eat more of their favorite foods from only one food group and, as a result, miss getting the nutrients they need. So, it is important to pay attention not only to what you eat but also to what you are missing from your diet. You can eat a healthy, balanced diet by making a few small changes. Follow-up care is a key part of your treatment and safety. Be sure to make and go to all appointments, and call your doctor if you are having problems. It's also a good idea to know your test results and keep a list of the medicines you take. How can you care for yourself at home? Look at what you eat  · Keep a food diary for a week or two and record everything you eat or drink. Track the number of servings you eat from each food group. · For a balanced diet every day, eat a variety of:  ? 6 or more ounce-equivalents of grains, such as cereals, breads, crackers, rice, or pasta, every day. An ounce-equivalent is 1 slice of bread, 1 cup of ready-to-eat cereal, or ½ cup of cooked rice, cooked pasta, or cooked cereal.  ? 2½ cups of vegetables, especially:  § Dark-green vegetables such as broccoli and spinach. § Orange vegetables such as carrots and sweet potatoes. § Dry beans (such as tamayo and kidney beans) and peas (such as lentils). ? 2 cups of fresh, frozen, or canned fruit. A small apple or 1 banana or orange equals 1 cup. ? 3 cups of nonfat or low-fat milk, yogurt, or other milk products. ? 5½ ounces of meat and beans, such as chicken, fish, lean meat, beans, nuts, and seeds.  One egg, 1 tablespoon of peanut butter, ½ ounce nuts or seeds, or ¼ cup of cooked beans equals 1 ounce of meat. · Learn how to read food labels for serving sizes and ingredients. Fast-food and convenience-food meals often contain few or no fruits or vegetables. Make sure you eat some fruits and vegetables to make the meal more nutritious. · Look at your food diary. For each food group, add up what you have eaten and then divide the total by the number of days. This will give you an idea of how much you are eating from each food group. See if you can find some ways to change your diet to make it more healthy. Start small  · Do not try to make dramatic changes to your diet all at once. You might feel that you are missing out on your favorite foods and then be more likely to fail. · Start slowly, and gradually change your habits. Try some of the following:  ? Use whole wheat bread instead of white bread. ? Use nonfat or low-fat milk instead of whole milk. ? Eat brown rice instead of white rice, and eat whole wheat pasta instead of white-flour pasta. ? Try low-fat cheeses and low-fat yogurt. ? Add more fruits and vegetables to meals and have them for snacks. ? Add lettuce, tomato, cucumber, and onion to sandwiches. ? Add fruit to yogurt and cereal.  Enjoy food  · You can still eat your favorite foods. You just may need to eat less of them. If your favorite foods are high in fat, salt, and sugar, limit how often you eat them, but do not cut them out entirely. · Eat a wide variety of foods. Make healthy choices when eating out  · The type of restaurant you choose can help you make healthy choices. Even fast-food chains are now offering more low-fat or healthier choices on the menu. · Choose smaller portions, or take half of your meal home. · When eating out, try:  ? A veggie pizza with a whole wheat crust or grilled chicken (instead of sausage or pepperoni).   ? Pasta with roasted vegetables, grilled chicken, or marinara sauce instead of cream sauce. ? A vegetable wrap or grilled chicken wrap. ? Broiled or poached food instead of fried or breaded items. Make healthy choices easy  · Buy packaged, prewashed, ready-to-eat fresh vegetables and fruits, such as baby carrots, salad mixes, and chopped or shredded broccoli and cauliflower. · Buy packaged, presliced fruits, such as melon or pineapple. · Choose 100% fruit or vegetable juice instead of soda. Limit juice intake to 4 to 6 oz (½ to ¾ cup) a day. · Blend low-fat yogurt, fruit juice, and canned or frozen fruit to make a smoothie for breakfast or a snack. Where can you learn more? Go to http://mervin-ashish.info/. Enter T756 in the search box to learn more about \"Eating Healthy Foods: Care Instructions. \"  Current as of: March 28, 2018  Content Version: 11.9  © 0015-5422 [employer]. Care instructions adapted under license by [employer] (which disclaims liability or warranty for this information). If you have questions about a medical condition or this instruction, always ask your healthcare professional. [place] [address] any warranty or liability for your use of this information.

## 2019-06-28 DIAGNOSIS — E66.01 MORBID OBESITY (HCC): ICD-10-CM

## 2019-06-28 DIAGNOSIS — E55.9 VITAMIN D DEFICIENCY: ICD-10-CM

## 2019-06-28 DIAGNOSIS — D64.9 ANEMIA, UNSPECIFIED TYPE: ICD-10-CM

## 2019-06-28 DIAGNOSIS — Z98.84 S/P GASTRIC BYPASS: ICD-10-CM

## 2019-06-28 DIAGNOSIS — K91.2 POSTSURGICAL NONABSORPTION: Primary | ICD-10-CM

## 2019-06-28 DIAGNOSIS — E53.8 VITAMIN B12 DEFICIENCY: ICD-10-CM

## 2019-07-02 LAB
25(OH)D3+25(OH)D2 SERPL-MCNC: 38.7 NG/ML (ref 30–100)
ALBUMIN SERPL-MCNC: 4.5 G/DL (ref 3.5–5.5)
ALBUMIN/GLOB SERPL: 1.6 {RATIO} (ref 1.2–2.2)
ALP SERPL-CCNC: 56 IU/L (ref 39–117)
ALT SERPL-CCNC: 29 IU/L (ref 0–44)
AST SERPL-CCNC: 24 IU/L (ref 0–40)
BILIRUB SERPL-MCNC: 0.3 MG/DL (ref 0–1.2)
BUN SERPL-MCNC: 21 MG/DL (ref 6–24)
BUN/CREAT SERPL: 18 (ref 9–20)
CALCIUM SERPL-MCNC: 9.8 MG/DL (ref 8.7–10.2)
CHLORIDE SERPL-SCNC: 97 MMOL/L (ref 96–106)
CO2 SERPL-SCNC: 29 MMOL/L (ref 20–29)
CREAT SERPL-MCNC: 1.15 MG/DL (ref 0.76–1.27)
ERYTHROCYTE [DISTWIDTH] IN BLOOD BY AUTOMATED COUNT: 13.9 % (ref 12.3–15.4)
GLOBULIN SER CALC-MCNC: 2.9 G/DL (ref 1.5–4.5)
GLUCOSE SERPL-MCNC: 90 MG/DL (ref 65–99)
HCT VFR BLD AUTO: 40.2 % (ref 37.5–51)
HGB BLD-MCNC: 13.8 G/DL (ref 13–17.7)
IRON SERPL-MCNC: 81 UG/DL (ref 38–169)
MCH RBC QN AUTO: 31.5 PG (ref 26.6–33)
MCHC RBC AUTO-ENTMCNC: 34.3 G/DL (ref 31.5–35.7)
MCV RBC AUTO: 92 FL (ref 79–97)
PLATELET # BLD AUTO: 319 X10E3/UL (ref 150–450)
POTASSIUM SERPL-SCNC: 3.6 MMOL/L (ref 3.5–5.2)
PROT SERPL-MCNC: 7.4 G/DL (ref 6–8.5)
RBC # BLD AUTO: 4.38 X10E6/UL (ref 4.14–5.8)
SODIUM SERPL-SCNC: 140 MMOL/L (ref 134–144)
VIT B12 SERPL-MCNC: 906 PG/ML (ref 232–1245)
WBC # BLD AUTO: 6.4 X10E3/UL (ref 3.4–10.8)

## 2019-07-29 ENCOUNTER — OFFICE VISIT (OUTPATIENT)
Dept: SURGERY | Age: 49
End: 2019-07-29

## 2019-07-29 VITALS
RESPIRATION RATE: 16 BRPM | SYSTOLIC BLOOD PRESSURE: 121 MMHG | HEIGHT: 69 IN | WEIGHT: 233 LBS | TEMPERATURE: 98.3 F | OXYGEN SATURATION: 98 % | DIASTOLIC BLOOD PRESSURE: 79 MMHG | HEART RATE: 84 BPM | BODY MASS INDEX: 34.51 KG/M2

## 2019-07-29 DIAGNOSIS — Z98.84 S/P GASTRIC BYPASS: ICD-10-CM

## 2019-07-29 DIAGNOSIS — E66.01 MORBID OBESITY (HCC): Primary | ICD-10-CM

## 2019-07-29 NOTE — PROGRESS NOTES
HISTORY OF PRESENT ILLNESS  Ailyn Alejandro is a 53 y.o. male with previous Lap gastric bypass, NIssen, and partial gastrectomy  surgery  . He has lost a total of 60.5  pounds since surgery. He  Has lost 4 lbs since the last ov. Body mass index is 34.41 kg/m². Nate Crisostomo no nausea and no vomiting . + Acid reflux/heartburn recently. . Drinking  64 ounces of water daily. 50-60  protein intake daily. +BM's. Pt is doing ti chi  for exercise. He states that he felt a lightheaded this morning after eating a banana and toast.   Dietary recall -    Breakfast- yogurt, shake, omelet  Lunch-  [place], lean cuisine  Dinner- tenderlion and sweet pototo  He is snacking between meals; almonds, cheese. Vitamins:  MVI : yes  Calcium : yes  B-Vit 12: yes    Patient has an advanced directive:  Not on file. Mr. Paige Alejandro has a reminder for a \"due or due soon\" health maintenance. I have asked that he contact his primary care provider for follow-up on this health maintenance. Co-Morbid(s)             COMORBIDITY     SLEEP APNEA                 YES        GERD  (req.meds)            NO  HYPERLIPIDEMIA            YES  HYPERTENSION              YES         DIABETES                         NO           Current Outpatient Medications:     chlorthalidone (HYGROTEN) 25 mg tablet, Take  by mouth daily. , Disp: , Rfl:     spironolactone (ALDACTONE) 25 mg tablet, Take  by mouth daily. , Disp: , Rfl:     NASCOBAL 500 mcg/spray spry, 1 Spray every seven (7) days. , Disp: , Rfl:     CALCIUM CITRATE PO, Take 1 Tab by mouth two (2) times a day., Disp: , Rfl:     ferrous sulfate (IRON) 325 mg (65 mg iron) tablet, Take 325 mg by mouth Daily (before breakfast). , Disp: , Rfl:     multivitamin (ONE A DAY) tablet, Take 1 Tab by mouth two (2) times a day., Disp: , Rfl:     lisinopril (PRINIVIL, ZESTRIL) 40 mg tablet, Take 40 mg by mouth daily. , Disp: , Rfl:     amLODIPine (NORVASC) 10 mg tablet, Take 10 mg by mouth daily. , Disp: , Rfl:    ergocalciferol (ERGOCALCIFEROL) 50,000 unit capsule, Take 1 Cap by mouth every seven (7) days. , Disp: 12 Cap, Rfl: 0    ondansetron (ZOFRAN ODT) 4 mg disintegrating tablet, Take 1 Tab by mouth every eight (8) hours as needed for Nausea., Disp: 30 Tab, Rfl: 0    labetalol (NORMODYNE) 100 mg tablet, Take 300 mg by mouth two (2) times a day., Disp: , Rfl:     allopurinol (ZYLOPRIM) 300 mg tablet, Take 300 mg by mouth daily. , Disp: , Rfl:     Dexlansoprazole 60 mg CpDB, Take 60 mg by mouth., Disp: , Rfl:     simvastatin (ZOCOR) 10 mg tablet, Take 10 mg by mouth daily. , Disp: , Rfl:     sertraline (ZOLOFT) 100 mg tablet, Take 100 mg by mouth daily. , Disp: , Rfl:     traZODone (DESYREL) 100 mg tablet, Take 100 mg by mouth nightly., Disp: , Rfl:       Visit Vitals  /79 (BP 1 Location: Left arm, BP Patient Position: Sitting)   Pulse 84   Temp 98.3 °F (36.8 °C) (Oral)   Resp 16   Ht 5' 9\" (1.753 m)   Wt 233 lb (105.7 kg)   SpO2 98%   BMI 34.41 kg/m²     HPI    Review of Systems   Gastrointestinal: Positive for heartburn (occassionally). Negative for abdominal pain, diarrhea, nausea and vomiting. Neurological: Positive for dizziness. Physical Exam   Constitutional: He is oriented to person, place, and time. He appears well-developed and well-nourished. Cardiovascular: Normal rate, regular rhythm and normal heart sounds. Pulmonary/Chest: Effort normal and breath sounds normal.   Abdominal: Soft. Bowel sounds are normal. He exhibits no distension. There is no tenderness. No masses or hernia   Musculoskeletal: Normal range of motion. Neurological: He is alert and oriented to person, place, and time. Skin: Skin is warm and dry. Psychiatric: He has a normal mood and affect. ASSESSMENT and PLAN  Bernardo Juan is a 53 y.o. male with previous Lap gastric bypass, NIssen, and partial gastrectomy  surgery  . He has lost a total of 60.5  pounds since surgery. He  Has lost 4 lbs since the last ov. Body mass index is 34.41 kg/m². Destinee Burch no nausea and no vomiting . + Acid reflux/heartburn. . Drinking  64 ounces of water daily. 50-60  protein intake daily. +BM's. Pt is doing ti chi  for exercise. He states that he felt a lightheaded this morning after eating a banana and toast.   We discussed reactive hypoglycemia. He should trial Crystal light and foods that could be causing reflux. Advised patient regard to diet that is high-protein, low-fat, low-sugar, limited carbohydrates. Strive for 50-60 grams of protein daily. If having a snack, foods that are protein or fiber rich. . No eating/drinking together, chew foods well, and portion control. Measure meals. Discussed snacking behavior and to [facility] pay attention to behavioral factor and habits. Drink at least 40-64 ounces of water or non-calorie/non-carbonated beverages daily. Continue vitamin regiment daily. Exercise at least 3 days a week with cardiovascular and strength training. Patient to follow up in 6 months. . Advised to call office if any questions/concerns. Pt verbalized understanding and questions were answered to the best of my knowledge and ability.

## 2019-07-29 NOTE — PATIENT INSTRUCTIONS
Eating Healthy Foods: Care Instructions  Your Care Instructions    Eating healthy foods can help lower your risk for disease. Healthy food gives you energy and keeps your heart strong, your brain active, your muscles working, and your bones strong. A healthy diet includes a variety of foods from the basic food groups: grains, vegetables, fruits, milk and milk products, and meat and beans. Some people may eat more of their favorite foods from only one food group and, as a result, miss getting the nutrients they need. So, it is important to pay attention not only to what you eat but also to what you are missing from your diet. You can eat a healthy, balanced diet by making a few small changes. Follow-up care is a key part of your treatment and safety. Be sure to make and go to all appointments, and call your doctor if you are having problems. It's also a good idea to know your test results and keep a list of the medicines you take. How can you care for yourself at home? Look at what you eat  · Keep a food diary for a week or two and record everything you eat or drink. Track the number of servings you eat from each food group. · For a balanced diet every day, eat a variety of:  ? 6 or more ounce-equivalents of grains, such as cereals, breads, crackers, rice, or pasta, every day. An ounce-equivalent is 1 slice of bread, 1 cup of ready-to-eat cereal, or ½ cup of cooked rice, cooked pasta, or cooked cereal.  ? 2½ cups of vegetables, especially:  § Dark-green vegetables such as broccoli and spinach. § Orange vegetables such as carrots and sweet potatoes. § Dry beans (such as tamayo and kidney beans) and peas (such as lentils). ? 2 cups of fresh, frozen, or canned fruit. A small apple or 1 banana or orange equals 1 cup. ? 3 cups of nonfat or low-fat milk, yogurt, or other milk products. ? 5½ ounces of meat and beans, such as chicken, fish, lean meat, beans, nuts, and seeds.  One egg, 1 tablespoon of peanut butter, ½ ounce nuts or seeds, or ¼ cup of cooked beans equals 1 ounce of meat. · Learn how to read food labels for serving sizes and ingredients. Fast-food and convenience-food meals often contain few or no fruits or vegetables. Make sure you eat some fruits and vegetables to make the meal more nutritious. · Look at your food diary. For each food group, add up what you have eaten and then divide the total by the number of days. This will give you an idea of how much you are eating from each food group. See if you can find some ways to change your diet to make it more healthy. Start small  · Do not try to make dramatic changes to your diet all at once. You might feel that you are missing out on your favorite foods and then be more likely to fail. · Start slowly, and gradually change your habits. Try some of the following:  ? Use whole wheat bread instead of white bread. ? Use nonfat or low-fat milk instead of whole milk. ? Eat brown rice instead of white rice, and eat whole wheat pasta instead of white-flour pasta. ? Try low-fat cheeses and low-fat yogurt. ? Add more fruits and vegetables to meals and have them for snacks. ? Add lettuce, tomato, cucumber, and onion to sandwiches. ? Add fruit to yogurt and cereal.  Enjoy food  · You can still eat your favorite foods. You just may need to eat less of them. If your favorite foods are high in fat, salt, and sugar, limit how often you eat them, but do not cut them out entirely. · Eat a wide variety of foods. Make healthy choices when eating out  · The type of restaurant you choose can help you make healthy choices. Even fast-food chains are now offering more low-fat or healthier choices on the menu. · Choose smaller portions, or take half of your meal home. · When eating out, try:  ? A veggie pizza with a whole wheat crust or grilled chicken (instead of sausage or pepperoni).   ? Pasta with roasted vegetables, grilled chicken, or marinara sauce instead of cream sauce. ? A vegetable wrap or grilled chicken wrap. ? Broiled or poached food instead of fried or breaded items. Make healthy choices easy  · Buy packaged, prewashed, ready-to-eat fresh vegetables and fruits, such as baby carrots, salad mixes, and chopped or shredded broccoli and cauliflower. · Buy packaged, presliced fruits, such as melon or pineapple. · Choose 100% fruit or vegetable juice instead of soda. Limit juice intake to 4 to 6 oz (½ to ¾ cup) a day. · Blend low-fat yogurt, fruit juice, and canned or frozen fruit to make a smoothie for breakfast or a snack. Where can you learn more? Go to http://mervin-ashish.info/. Enter T756 in the search box to learn more about \"Eating Healthy Foods: Care Instructions. \"  Current as of: November 7, 2018  Content Version: 12.1  © 4915-9168 [employer]. Care instructions adapted under license by [employer] (which disclaims liability or warranty for this information). If you have questions about a medical condition or this instruction, always ask your healthcare professional. [place] [address] any warranty or liability for your use of this information. Reactive Hypoglycemia    Reactive hypoglycemia is a medical term describing recurrent episodes of symptomatic hypoglycemia (low blood sugar) occurring 2-4 hours after eating a high carbohydrate meal.    There are two kinds of hypoglycemia, FASTING and REACTIVE. The REACTIVE type is more common and happens when the blood sugar falls too low after a meal or snack. FASTING hypoglycemia occurs when the stomach is empty and no food has been eaten for several hours. People with REACTIVE hypoglycemia still produce insulin through their pancreas. Insulin is the hormone that is needed to regulate blood glucose (blood sugar).  It's not as efficient as it should be, resulting in too much production of insulin in the blood stream, causing the blood sugar to drop too low. It's the body's inability to handle large amounts of sugar (in some cases even small amounts) that is consumed throughout the day. Some symptoms of REACTIVE hypoglycemia may be:      * SWEATING  * HUNGER   * DROWSINES  * DIFFICULTY SPEAKING   * ANXIETY   * CONFUSION   * NAUSEA   * TREMBLING   * HEADACHES  * DIZZINESS   * IRRITABILITY  * CRAVING SWEETS   * RUNNY NOSE  * DEPRESSION   * FEELING WARM OR FLUSHED   * INABILITY TO CONCENTRATE      In most patients, REACTIVE hypoglycemia is completely avoidable and can be managed with dietary changes. GLYCEMIC INDEX    The GLYCEMIC INDEX (GI) is a measure of the effects of carbohydrates on blood sugar levels. Carbohydrates that break down easily and quickly during digestion, release glucose rapidly into the bloodstream are HIGH GI FOODS. Carbohydrates that break down more slowly have a LOW GI rating. Foods that are LOW GI are high in fiber. Protein rich foods are LOW GI. Foods that are highly processes are usually HIGH GI foods. Most starchy, sugary, snack foods are HIGH GI and should be avoided. LOW GI (Good choices)  Most vegetables ok (except          potatoes, corn, peas and               carrots). Whole fresh fruit like        apples, berries, grapes,                 pineapple (Avoid dries fruit, fruit    juices, watermelon, bananas). High fiber breads). High fiber        breads (>3 grams per slice);         legumes (navy beans, tamayo           beans, kidney beans, lentils,     etc); low-fat dairy products.      MEDIUM GI  Whole wheat products, sweet       potato, brown rice, plain oatmeal.       HIGH GI (AVOID)  Corn flakes, Rice Krispies, rice     cakes, popcorn, pretzels, snack    crackers, animal crackers, fig        bars, dried fruits, fruit juices,          white rice, white pastas, bagels,    breads, rolls, white/baked              potatoes, grits, muffins,                 pancakes, cookies, candies,         cakes, sugary drinks, alcohol,        any highly processed sugary        cereal, snack food or        convenience food. ** Eat 4-5 small meals daily. Make sure each mini meal has protein and fiber. Choose foods with a LOW GLYCEMIC INDEX and you can avoid symptoms of REACTIVE HYPOGLYCEMIA and feel better! TIP #1     Treatment of REACTIVE hypoglycemia is an ongoing process;         remember to have a good diet with evenly spaced meals   throughout the day. Tip #2   Daily exercise can help stabilize blood sugars and help control the   symptoms of hypoglycemia. Tip #3   Choose protein and fiber rich foods every meal. Lean meats or   seafood, low fat dairy products and colorful vegetables are great,   choices. Tip #4   If you experience a low blood sugar, drink 1/2 cup of skim milk   (fat-free). 1% milk or eat a light yogurt. Ideally, try and eat something with some protein that can be easily ingested. A High protein Slimfast, Low-Carb [place] Instant Breakfast or Boost Glucose control will also work, as will 1/2 a protein bar. Tip #5   Avoid alcohol, caffeine, sugar and highly starchy foods such as white rice, bread, crackers, popcorn, potatoes, pretzels, and other snack type foods that are highly processed with very little nutritional benefit (empty calories). Tip #6   Avoid fruit juices and dried fruits. The natural sugar content is very high and will contribute to hypoglycemic episodes.

## 2020-01-20 ENCOUNTER — OFFICE VISIT (OUTPATIENT)
Dept: SURGERY | Age: 50
End: 2020-01-20

## 2020-01-20 VITALS
RESPIRATION RATE: 18 BRPM | HEART RATE: 86 BPM | BODY MASS INDEX: 33.92 KG/M2 | HEIGHT: 69 IN | SYSTOLIC BLOOD PRESSURE: 122 MMHG | OXYGEN SATURATION: 96 % | WEIGHT: 229 LBS | TEMPERATURE: 99.3 F | DIASTOLIC BLOOD PRESSURE: 81 MMHG

## 2020-01-20 DIAGNOSIS — Z98.84 S/P GASTRIC BYPASS: ICD-10-CM

## 2020-01-20 DIAGNOSIS — E66.01 MORBID OBESITY (HCC): Primary | ICD-10-CM

## 2020-01-20 NOTE — PATIENT INSTRUCTIONS
Eating Healthy Foods: Care Instructions Your Care Instructions Eating healthy foods can help lower your risk for disease. Healthy food gives you energy and keeps your heart strong, your brain active, your muscles working, and your bones strong. A healthy diet includes a variety of foods from the basic food groups: grains, vegetables, fruits, milk and milk products, and meat and beans. Some people may eat more of their favorite foods from only one food group and, as a result, miss getting the nutrients they need. So, it is important to pay attention not only to what you eat but also to what you are missing from your diet. You can eat a healthy, balanced diet by making a few small changes. Follow-up care is a key part of your treatment and safety. Be sure to make and go to all appointments, and call your doctor if you are having problems. It's also a good idea to know your test results and keep a list of the medicines you take. How can you care for yourself at home? Look at what you eat · Keep a food diary for a week or two and record everything you eat or drink. Track the number of servings you eat from each food group. · For a balanced diet every day, eat a variety of: 
? 6 or more ounce-equivalents of grains, such as cereals, breads, crackers, rice, or pasta, every day. An ounce-equivalent is 1 slice of bread, 1 cup of ready-to-eat cereal, or ½ cup of cooked rice, cooked pasta, or cooked cereal. 
? 2½ cups of vegetables, especially: § Dark-green vegetables such as broccoli and spinach. § Orange vegetables such as carrots and sweet potatoes. § Dry beans (such as tamayo and kidney beans) and peas (such as lentils). ? 2 cups of fresh, frozen, or canned fruit. A small apple or 1 banana or orange equals 1 cup. ? 3 cups of nonfat or low-fat milk, yogurt, or other milk products.  
? 5½ ounces of meat and beans, such as chicken, fish, lean meat, beans, nuts, and seeds. One egg, 1 tablespoon of peanut butter, ½ ounce nuts or seeds, or ¼ cup of cooked beans equals 1 ounce of meat. · Learn how to read food labels for serving sizes and ingredients. Fast-food and convenience-food meals often contain few or no fruits or vegetables. Make sure you eat some fruits and vegetables to make the meal more nutritious. · Look at your food diary. For each food group, add up what you have eaten and then divide the total by the number of days. This will give you an idea of how much you are eating from each food group. See if you can find some ways to change your diet to make it more healthy. Start small · Do not try to make dramatic changes to your diet all at once. You might feel that you are missing out on your favorite foods and then be more likely to fail. · Start slowly, and gradually change your habits. Try some of the following: ? Use whole wheat bread instead of white bread. ? Use nonfat or low-fat milk instead of whole milk. ? Eat brown rice instead of white rice, and eat whole wheat pasta instead of white-flour pasta. ? Try low-fat cheeses and low-fat yogurt. ? Add more fruits and vegetables to meals and have them for snacks. ? Add lettuce, tomato, cucumber, and onion to sandwiches. ? Add fruit to yogurt and cereal. 
Enjoy food · You can still eat your favorite foods. You just may need to eat less of them. If your favorite foods are high in fat, salt, and sugar, limit how often you eat them, but do not cut them out entirely. · Eat a wide variety of foods. Make healthy choices when eating out · The type of restaurant you choose can help you make healthy choices. Even fast-food chains are now offering more low-fat or healthier choices on the menu. · Choose smaller portions, or take half of your meal home. · When eating out, try: ? A veggie pizza with a whole wheat crust or grilled chicken (instead of sausage or pepperoni). ? Pasta with roasted vegetables, grilled chicken, or marinara sauce instead of cream sauce. ? A vegetable wrap or grilled chicken wrap. ? Broiled or poached food instead of fried or breaded items. Make healthy choices easy · Buy packaged, prewashed, ready-to-eat fresh vegetables and fruits, such as baby carrots, salad mixes, and chopped or shredded broccoli and cauliflower. · Buy packaged, presliced fruits, such as melon or pineapple. · Choose 100% fruit or vegetable juice instead of soda. Limit juice intake to 4 to 6 oz (½ to ¾ cup) a day. · Blend low-fat yogurt, fruit juice, and canned or frozen fruit to make a smoothie for breakfast or a snack. Where can you learn more? Go to http://mervin-ashish.info/. Enter T756 in the search box to learn more about \"Eating Healthy Foods: Care Instructions. \" Current as of: November 7, 2018 Content Version: 12.2 © 2739-0413 [employer]. Care instructions adapted under license by [employer] (which disclaims liability or warranty for this information). If you have questions about a medical condition or this instruction, always ask your healthcare professional. [address] any warranty or liability for your use of this information. Opurity Multi vitamin by Pia Leventhal

## 2020-01-20 NOTE — PROGRESS NOTES
HISTORY OF PRESENT ILLNESS  Flor Camacho is a 51 y.o. male with previous Lap gastric bypass, Nissen and partial gastrectomy surgery 18 months ago . He has lost a total of 64.5 pounds since surgery. He  Has lost 4 lbs since the last ov. Body mass index is 33.82 kg/m². [place] no nausea and no vomiting . Denies Acid reflux/heartburn. . Drinking  50-65 ounces of water daily. 60+  protein intake daily. +BM's.   Dietary recall -    Breakfast- 1/2 sandwich with eggs  Lunch- caesar salad  Dinner- salmon and vegetables. He is snacking between meals; peanuts. Vitamins:  MVI : yes  Calcium :  yes  B-Vit 12:  yes    Patient has an advanced directive: not on file. Mr. Talia Camacho has a reminder for a \"due or due soon\" health maintenance. I have asked that he contact his primary care provider for follow-up on this health maintenance. Co-Morbid(s)                COMORBIDITY     SLEEP APNEA                 YES        GERD  (req.meds)            NO  HYPERLIPIDEMIA            YES  HYPERTENSION              YES         DIABETES                         NO           Current Outpatient Medications:     chlorthalidone (HYGROTEN) 25 mg tablet, Take  by mouth daily. , Disp: , Rfl:     spironolactone (ALDACTONE) 25 mg tablet, Take  by mouth daily. , Disp: , Rfl:     NASCOBAL 500 mcg/spray spry, 1 Spray every seven (7) days. , Disp: , Rfl:     CALCIUM CITRATE PO, Take 1 Tab by mouth two (2) times a day., Disp: , Rfl:     ferrous sulfate (IRON) 325 mg (65 mg iron) tablet, Take 325 mg by mouth Daily (before breakfast). , Disp: , Rfl:     multivitamin (ONE A DAY) tablet, Take 1 Tab by mouth two (2) times a day., Disp: , Rfl:     lisinopril (PRINIVIL, ZESTRIL) 40 mg tablet, Take 40 mg by mouth daily. , Disp: , Rfl:     amLODIPine (NORVASC) 10 mg tablet, Take 10 mg by mouth daily. , Disp: , Rfl:     ergocalciferol (ERGOCALCIFEROL) 50,000 unit capsule, Take 1 Cap by mouth every seven (7) days. , Disp: 12 Cap, Rfl: 0    labetalol (NORMODYNE) 100 mg tablet, Take 300 mg by mouth two (2) times a day., Disp: , Rfl:     allopurinol (ZYLOPRIM) 300 mg tablet, Take 300 mg by mouth daily. , Disp: , Rfl:     simvastatin (ZOCOR) 10 mg tablet, Take 10 mg by mouth daily. , Disp: , Rfl:     sertraline (ZOLOFT) 100 mg tablet, Take 100 mg by mouth daily. , Disp: , Rfl:     traZODone (DESYREL) 100 mg tablet, Take 100 mg by mouth nightly., Disp: , Rfl:     ondansetron (ZOFRAN ODT) 4 mg disintegrating tablet, Take 1 Tab by mouth every eight (8) hours as needed for Nausea., Disp: 30 Tab, Rfl: 0    Dexlansoprazole 60 mg CpDB, Take 60 mg by mouth., Disp: , Rfl:       Visit Vitals  /81 (BP 1 Location: Left arm, BP Patient Position: Sitting)   Pulse 86   Temp 99.3 °F (37.4 °C) (Oral)   Resp 18   Ht 5' 9\" (1.753 m)   Wt 229 lb (103.9 kg)   SpO2 96%   BMI 33.82 kg/m²     HPI    Review of Systems   Respiratory: Negative for shortness of breath. Cardiovascular: Negative for chest pain. Gastrointestinal: Negative for abdominal pain, heartburn, nausea and vomiting. Neurological: Negative for dizziness and headaches. Physical Exam   Constitutional: He is oriented to person, place, and time. He appears well-developed and well-nourished. Cardiovascular: Normal rate, regular rhythm and normal heart sounds. Pulmonary/Chest: Effort normal and breath sounds normal.   Abdominal: Soft. Bowel sounds are normal. He exhibits no distension. There is no tenderness. No masses or hernia   Musculoskeletal: Normal range of motion. Neurological: He is alert and oriented to person, place, and time. Skin: Skin is warm and dry. Psychiatric: He has a normal mood and affect. ASSESSMENT and PLAN  Nataliia Mancia is a 51 y.o. male with previous Lap gastric bypass, Nissen and partial gastrectomy surgery 18 months ago . He has lost a total of 64.5 pounds since surgery. He  Has lost 4 lbs since the last ov. Body mass index is 33.82 kg/m². .  May switch to opuirty vitamins. Advised patient regard to diet that is high-protein, low-fat, low-sugar, limited carbohydrates. Strive for 50-60 grams of protein daily. If having a snack, foods that are protein or fiber rich. . No eating/drinking together, chew foods well, and portion control. Measure meals. Discussed snacking behavior and to [facility] pay attention to behavioral factor and habits. Drink at least 40-64 ounces of water or non-calorie/non-carbonated beverages daily. Continue vitamin regiment daily. Exercise at least 3 days a week with cardiovascular and strength training. Patient to follow up in 6 months. . Advised to call office if any questions/concerns. Pt verbalized understanding and questions were answered to the best of my knowledge and ability.

## 2020-01-20 NOTE — PROGRESS NOTES
1. Have you been to the ER, urgent care clinic since your last visit? Hospitalized since your last visit? No    2. Have you seen or consulted any other health care providers outside of the [address] [place] [address] since your last visit? Include any pap smears or colon screening.  No

## 2020-07-21 ENCOUNTER — OFFICE VISIT (OUTPATIENT)
Dept: SURGERY | Age: 50
End: 2020-07-21

## 2020-07-21 VITALS
HEART RATE: 77 BPM | RESPIRATION RATE: 18 BRPM | OXYGEN SATURATION: 98 % | DIASTOLIC BLOOD PRESSURE: 85 MMHG | BODY MASS INDEX: 33.18 KG/M2 | TEMPERATURE: 98 F | SYSTOLIC BLOOD PRESSURE: 115 MMHG | HEIGHT: 69 IN | WEIGHT: 224 LBS

## 2020-07-21 DIAGNOSIS — E53.8 VITAMIN B12 DEFICIENCY: ICD-10-CM

## 2020-07-21 DIAGNOSIS — K91.2 POSTSURGICAL NONABSORPTION: ICD-10-CM

## 2020-07-21 DIAGNOSIS — E55.9 VITAMIN D DEFICIENCY: ICD-10-CM

## 2020-07-21 DIAGNOSIS — D64.9 ANEMIA, UNSPECIFIED TYPE: ICD-10-CM

## 2020-07-21 DIAGNOSIS — E66.01 MORBID OBESITY (HCC): Primary | ICD-10-CM

## 2020-07-21 DIAGNOSIS — Z98.84 S/P GASTRIC BYPASS: ICD-10-CM

## 2020-07-21 RX ORDER — ONDANSETRON 4 MG/1
4 TABLET, ORALLY DISINTEGRATING ORAL
Qty: 30 TAB | Refills: 0 | Status: SHIPPED | OUTPATIENT
Start: 2020-07-21 | End: 2021-09-21 | Stop reason: SDUPTHER

## 2020-07-21 NOTE — PATIENT INSTRUCTIONS
Eating Healthy Foods: Care Instructions  Your Care Instructions     Eating healthy foods can help lower your risk for disease. Healthy food gives you energy and keeps your heart strong, your brain active, your muscles working, and your bones strong. A healthy diet includes a variety of foods from the basic food groups: grains, vegetables, fruits, milk and milk products, and meat and beans. Some people may eat more of their favorite foods from only one food group and, as a result, miss getting the nutrients they need. So, it is important to pay attention not only to what you eat but also to what you are missing from your diet. You can eat a healthy, balanced diet by making a few small changes. Follow-up care is a key part of your treatment and safety. Be sure to make and go to all appointments, and call your doctor if you are having problems. It's also a good idea to know your test results and keep a list of the medicines you take. How can you care for yourself at home? Look at what you eat  · Keep a food diary for a week or two and record everything you eat or drink. Track the number of servings you eat from each food group. · For a balanced diet every day, eat a variety of:  ? 6 or more ounce-equivalents of grains, such as cereals, breads, crackers, rice, or pasta, every day. An ounce-equivalent is 1 slice of bread, 1 cup of ready-to-eat cereal, or ½ cup of cooked rice, cooked pasta, or cooked cereal.  ? 2½ cups of vegetables, especially:  § Dark-green vegetables such as broccoli and spinach. § Orange vegetables such as carrots and sweet potatoes. § Dry beans (such as tamayo and kidney beans) and peas (such as lentils). ? 2 cups of fresh, frozen, or canned fruit. A small apple or 1 banana or orange equals 1 cup. ? 3 cups of nonfat or low-fat milk, yogurt, or other milk products. ? 5½ ounces of meat and beans, such as chicken, fish, lean meat, beans, nuts, and seeds.  One egg, 1 tablespoon of peanut butter, ½ ounce nuts or seeds, or ¼ cup of cooked beans equals 1 ounce of meat. · Learn how to read food labels for serving sizes and ingredients. Fast-food and convenience-food meals often contain few or no fruits or vegetables. Make sure you eat some fruits and vegetables to make the meal more nutritious. · Look at your food diary. For each food group, add up what you have eaten and then divide the total by the number of days. This will give you an idea of how much you are eating from each food group. See if you can find some ways to change your diet to make it more healthy. Start small  · Do not try to make dramatic changes to your diet all at once. You might feel that you are missing out on your favorite foods and then be more likely to fail. · Start slowly, and gradually change your habits. Try some of the following:  ? Use whole wheat bread instead of white bread. ? Use nonfat or low-fat milk instead of whole milk. ? Eat brown rice instead of white rice, and eat whole wheat pasta instead of white-flour pasta. ? Try low-fat cheeses and low-fat yogurt. ? Add more fruits and vegetables to meals and have them for snacks. ? Add lettuce, tomato, cucumber, and onion to sandwiches. ? Add fruit to yogurt and cereal.  Enjoy food  · You can still eat your favorite foods. You just may need to eat less of them. If your favorite foods are high in fat, salt, and sugar, limit how often you eat them, but do not cut them out entirely. · Eat a wide variety of foods. Make healthy choices when eating out  · The type of restaurant you choose can help you make healthy choices. Even fast-food chains are now offering more low-fat or healthier choices on the menu. · Choose smaller portions, or take half of your meal home. · When eating out, try:  ? A veggie pizza with a whole wheat crust or grilled chicken (instead of sausage or pepperoni).   ? Pasta with roasted vegetables, grilled chicken, or marinara sauce instead of cream sauce. ? A vegetable wrap or grilled chicken wrap. ? Broiled or poached food instead of fried or breaded items. Make healthy choices easy  · Buy packaged, prewashed, ready-to-eat fresh vegetables and fruits, such as baby carrots, salad mixes, and chopped or shredded broccoli and cauliflower. · Buy packaged, presliced fruits, such as melon or pineapple. · Choose 100% fruit or vegetable juice instead of soda. Limit juice intake to 4 to 6 oz (½ to ¾ cup) a day. · Blend low-fat yogurt, fruit juice, and canned or frozen fruit to make a smoothie for breakfast or a snack. Where can you learn more? Go to http://mervin-ashish.info/  Enter T756 in the search box to learn more about \"Eating Healthy Foods: Care Instructions. \"  Current as of: August 22, 2019               Content Version: 12.5  © 5564-9278 [employer]. Care instructions adapted under license by [employer] (which disclaims liability or warranty for this information). If you have questions about a medical condition or this instruction, always ask your healthcare professional. [place] [address] any warranty or liability for your use of this information.

## 2020-07-21 NOTE — PROGRESS NOTES
HISTORY OF PRESENT ILLNESS  Kirstin Shipley is a 51 y.o. male with previous Lap gastric bypass, nissen and partial gastrectomy  surgery 2 years ago . He has lost a total of 69.5  pounds since surgery. He  has lost 5 lbs since the last ov. Body mass index is 33.08 kg/m². . Some nausea occasionally. Occasional  Acid reflux/heartburn when he does not drink enough. . Drinking  48-64 ounces of water daily. 60 grams protein intake daily. +  BM's. Pt is walking for exercise. Dietary recall -    Breakfast- nutrigrain bar and banana, granola and yogurt  Lunch- leftovers, sandwich  Dinner- grilled chicken sandwich, veg    He is snacking between meals; peanuts and fruit. Vitamins:  MVI : yes  Calcium : yes  B-Vit 12: yes  Vit D: Yes    Patient has an advanced directive: NO      Mr. Cassidy Shipley has a reminder for a \"due or due soon\" health maintenance. I have asked that he contact his primary care provider for follow-up on this health maintenance. COMORBIDITY     SLEEP APNEA                 YES        GERD  (req.meds)            NO  HYPERLIPIDEMIA            YES  HYPERTENSION              YES         DIABETES                         NO           Current Outpatient Medications:     chlorthalidone (HYGROTEN) 25 mg tablet, Take  by mouth daily. , Disp: , Rfl:     spironolactone (ALDACTONE) 25 mg tablet, Take  by mouth daily. , Disp: , Rfl:     CALCIUM CITRATE PO, Take 1 Tab by mouth two (2) times a day., Disp: , Rfl:     ferrous sulfate (IRON) 325 mg (65 mg iron) tablet, Take 325 mg by mouth Daily (before breakfast). , Disp: , Rfl:     multivitamin (ONE A DAY) tablet, Take 1 Tab by mouth two (2) times a day., Disp: , Rfl:     lisinopril (PRINIVIL, ZESTRIL) 40 mg tablet, Take 40 mg by mouth daily. , Disp: , Rfl:     amLODIPine (NORVASC) 10 mg tablet, Take 10 mg by mouth daily. , Disp: , Rfl:     ergocalciferol (ERGOCALCIFEROL) 50,000 unit capsule, Take 1 Cap by mouth every seven (7) days. , Disp: 12 Cap, Rfl: 0    labetalol (NORMODYNE) 100 mg tablet, Take 300 mg by mouth two (2) times a day., Disp: , Rfl:     allopurinol (ZYLOPRIM) 300 mg tablet, Take 300 mg by mouth daily. , Disp: , Rfl:     simvastatin (ZOCOR) 10 mg tablet, Take 10 mg by mouth daily. , Disp: , Rfl:     sertraline (ZOLOFT) 100 mg tablet, Take 100 mg by mouth daily. , Disp: , Rfl:     traZODone (DESYREL) 100 mg tablet, Take 100 mg by mouth nightly., Disp: , Rfl:     NASCOBAL 500 mcg/spray spry, 1 Spray every seven (7) days. , Disp: , Rfl:     ondansetron (ZOFRAN ODT) 4 mg disintegrating tablet, Take 1 Tab by mouth every eight (8) hours as needed for Nausea., Disp: 30 Tab, Rfl: 0    Dexlansoprazole 60 mg CpDB, Take 60 mg by mouth., Disp: , Rfl:       Visit Vitals  /85   Pulse 77   Temp 98 °F (36.7 °C) (Oral)   Resp 18   Ht 5' 9\" (1.753 m)   Wt 224 lb (101.6 kg)   SpO2 98%   BMI 33.08 kg/m²     HPI    Review of Systems   Respiratory: Negative for shortness of breath. Cardiovascular: Negative for chest pain. Gastrointestinal: Positive for heartburn (occassional) and nausea. Negative for abdominal pain and vomiting. Neurological: Positive for dizziness (occassional with postitional changes). Physical Exam  Constitutional:       Appearance: He is well-developed. Cardiovascular:      Rate and Rhythm: Normal rate and regular rhythm. Heart sounds: Normal heart sounds. Pulmonary:      Effort: Pulmonary effort is normal.      Breath sounds: Normal breath sounds. Comments: No masses or hernias noted  Abdominal:      General: Bowel sounds are normal. There is no distension. Palpations: Abdomen is soft. Tenderness: There is no abdominal tenderness. Musculoskeletal: Normal range of motion. Skin:     General: Skin is warm and dry. Neurological:      Mental Status: He is alert and oriented to person, place, and time.       Motor: Weakness:          ASSESSMENT and PLAN  Herman Wilkins is a 51 y.o. male with previous Lap gastric bypass, nissen and partial gastrectomy  surgery 2 years ago . He has lost a total of 69.5  pounds since surgery. He  has lost 5 lbs since the last ov. Body mass index is 33.08 kg/m². . Some nausea occasionally. Occasional  Acid reflux/heartburn when he does not drink enough. . Drinking  48-64 ounces of water daily. 60 grams protein intake daily. +  BM's. Pt is walking for exercise. Advised patient regard to diet that is high-protein, low-fat, low-sugar, limited carbohydrates. Strive for 50-60 grams of protein daily. If having a snack, foods that are protein or fiber rich. . No eating/drinking together, chew foods well, and portion control. Measure meals. Discussed snacking behavior and to [facility] pay attention to behavioral factor and habits. Drink at least 40-64 ounces of water or non-calorie/non-carbonated beverages daily. Continue vitamin regiment daily. Exercise at least 3 days a week with cardiovascular and strength training. Patient to follow up in 1 year Advised to call office if any questions/concerns. Will check nutrition labs.        15 minutes spent on the virtual platform with patient in directed conversation with the patient to include medical condition, assessment and plan

## 2020-07-21 NOTE — PROGRESS NOTES
1. Have you been to the ER, urgent care clinic since your last visit? Hospitalized since your last visit? No    2. Have you seen or consulted any other health care providers outside of the [address] [place] [address] since your last visit? Include any pap smears or colon screening.  No

## 2020-09-04 ENCOUNTER — HOSPITAL ENCOUNTER (OUTPATIENT)
Dept: LAB | Age: 50
Discharge: HOME | End: 2020-09-04

## 2020-09-04 LAB
IRON SATN MFR SERPL: 38 % (ref 20–50)
IRON SERPL-MCNC: 117 UG/DL (ref 35–150)
TIBC SERPL-MCNC: 308 UG/DL (ref 250–450)

## 2020-09-05 LAB
25(OH)D3 SERPL-MCNC: 39.1 NG/ML (ref 30–100)
ALBUMIN SERPL-MCNC: 4 G/DL (ref 3.5–5)
ALBUMIN/GLOB SERPL: 1.3 {RATIO} (ref 1.1–2.2)
ALP SERPL-CCNC: 70 U/L (ref 45–117)
ALT SERPL-CCNC: 35 U/L (ref 12–78)
ANION GAP SERPL CALC-SCNC: 6 MMOL/L (ref 5–15)
AST SERPL-CCNC: 19 U/L (ref 15–37)
BILIRUB SERPL-MCNC: 0.6 MG/DL (ref 0.2–1)
BUN SERPL-MCNC: 13 MG/DL (ref 6–20)
BUN/CREAT SERPL: 11 (ref 12–20)
CALCIUM SERPL-MCNC: 9.2 MG/DL (ref 8.5–10.1)
CHLORIDE SERPL-SCNC: 104 MMOL/L (ref 97–108)
CO2 SERPL-SCNC: 32 MMOL/L (ref 21–32)
CREAT SERPL-MCNC: 1.18 MG/DL (ref 0.7–1.3)
ERYTHROCYTE [DISTWIDTH] IN BLOOD BY AUTOMATED COUNT: 13.2 % (ref 11.5–14.5)
GLOBULIN SER CALC-MCNC: 3.2 G/DL (ref 2–4)
GLUCOSE SERPL-MCNC: 88 MG/DL (ref 65–100)
HCT VFR BLD AUTO: 40.1 % (ref 36.6–50.3)
HGB BLD-MCNC: 13.4 G/DL (ref 12.1–17)
MCH RBC QN AUTO: 31.9 PG (ref 26–34)
MCHC RBC AUTO-ENTMCNC: 33.4 G/DL (ref 30–36.5)
MCV RBC AUTO: 95.5 FL (ref 80–99)
NRBC # BLD: 0 K/UL (ref 0–0.01)
NRBC BLD-RTO: 0 PER 100 WBC
PLATELET # BLD AUTO: 266 K/UL (ref 150–400)
PMV BLD AUTO: 12 FL (ref 8.9–12.9)
POTASSIUM SERPL-SCNC: 4.3 MMOL/L (ref 3.5–5.1)
PROT SERPL-MCNC: 7.2 G/DL (ref 6.4–8.2)
RBC # BLD AUTO: 4.2 M/UL (ref 4.1–5.7)
SODIUM SERPL-SCNC: 142 MMOL/L (ref 136–145)
VIT B12 SERPL-MCNC: 1175 PG/ML (ref 193–986)
WBC # BLD AUTO: 5 K/UL (ref 4.1–11.1)

## 2021-03-27 ENCOUNTER — IMMUNIZATION (OUTPATIENT)
Dept: INTERNAL MEDICINE CLINIC | Age: 51
End: 2021-03-27
Payer: COMMERCIAL

## 2021-03-27 DIAGNOSIS — Z23 ENCOUNTER FOR IMMUNIZATION: Primary | ICD-10-CM

## 2021-03-27 PROCEDURE — 0001A COVID-19, MRNA, LNP-S, PF, 30MCG/0.3ML DOSE(PFIZER): CPT | Performed by: FAMILY MEDICINE

## 2021-03-27 PROCEDURE — 91300 COVID-19, MRNA, LNP-S, PF, 30MCG/0.3ML DOSE(PFIZER): CPT | Performed by: FAMILY MEDICINE

## 2021-04-17 ENCOUNTER — IMMUNIZATION (OUTPATIENT)
Dept: INTERNAL MEDICINE CLINIC | Age: 51
End: 2021-04-17
Payer: COMMERCIAL

## 2021-04-17 DIAGNOSIS — Z23 ENCOUNTER FOR IMMUNIZATION: Primary | ICD-10-CM

## 2021-04-17 PROCEDURE — 91300 COVID-19, MRNA, LNP-S, PF, 30MCG/0.3ML DOSE(PFIZER): CPT | Performed by: FAMILY MEDICINE

## 2021-04-17 PROCEDURE — 0002A COVID-19, MRNA, LNP-S, PF, 30MCG/0.3ML DOSE(PFIZER): CPT | Performed by: FAMILY MEDICINE

## 2021-05-17 ENCOUNTER — TELEPHONE (OUTPATIENT)
Dept: SURGERY | Age: 51
End: 2021-05-17

## 2021-09-21 ENCOUNTER — OFFICE VISIT (OUTPATIENT)
Dept: SURGERY | Age: 51
End: 2021-09-21
Payer: COMMERCIAL

## 2021-09-21 VITALS
HEIGHT: 69 IN | RESPIRATION RATE: 18 BRPM | WEIGHT: 209 LBS | SYSTOLIC BLOOD PRESSURE: 117 MMHG | HEART RATE: 85 BPM | TEMPERATURE: 100 F | OXYGEN SATURATION: 97 % | DIASTOLIC BLOOD PRESSURE: 86 MMHG | BODY MASS INDEX: 30.96 KG/M2

## 2021-09-21 DIAGNOSIS — E66.01 MORBID OBESITY (HCC): Primary | ICD-10-CM

## 2021-09-21 DIAGNOSIS — Z98.84 S/P GASTRIC BYPASS: ICD-10-CM

## 2021-09-21 PROCEDURE — 99212 OFFICE O/P EST SF 10 MIN: CPT | Performed by: NURSE PRACTITIONER

## 2021-09-21 RX ORDER — ONDANSETRON 4 MG/1
4 TABLET, ORALLY DISINTEGRATING ORAL
Qty: 30 TABLET | Refills: 0 | Status: SHIPPED | OUTPATIENT
Start: 2021-09-21

## 2021-09-21 NOTE — PROGRESS NOTES
1. Have you been to the ER, urgent care clinic since your last visit? Hospitalized since your last visit? no    2. Have you seen or consulted any other health care providers outside of the [address] [place] [address] since your last visit? Include any pap smears or colon screening.  no

## 2021-09-21 NOTE — PATIENT INSTRUCTIONS
Reactive Hypoglycemia    Reactive hypoglycemia is a medical term describing recurrent episodes of symptomatic hypoglycemia (low blood sugar) occurring 2-4 hours after eating a high carbohydrate meal.    There are two kinds of hypoglycemia, FASTING and REACTIVE. The REACTIVE type is more common and happens when the blood sugar falls too low after a meal or snack. FASTING hypoglycemia occurs when the stomach is empty and no food has been eaten for several hours. People with REACTIVE hypoglycemia still produce insulin through their pancreas. Insulin is the hormone that is needed to regulate blood glucose (blood sugar). It's not as efficient as it should be, resulting in too much production of insulin in the blood stream, causing the blood sugar to drop too low. It's the body's inability to handle large amounts of sugar (in some cases even small amounts) that is consumed throughout the day. Some symptoms of REACTIVE hypoglycemia may be:      * SWEATING  * HUNGER   * DROWSINES  * DIFFICULTY SPEAKING   * ANXIETY   * CONFUSION   * NAUSEA   * TREMBLING   * HEADACHES  * DIZZINESS   * IRRITABILITY  * CRAVING SWEETS   * RUNNY NOSE  * DEPRESSION   * FEELING WARM OR FLUSHED   * INABILITY TO CONCENTRATE      In most patients, REACTIVE hypoglycemia is completely avoidable and can be managed with dietary changes. GLYCEMIC INDEX    The GLYCEMIC INDEX (GI) is a measure of the effects of carbohydrates on blood sugar levels. Carbohydrates that break down easily and quickly during digestion, release glucose rapidly into the bloodstream are HIGH GI FOODS. Carbohydrates that break down more slowly have a LOW GI rating. Foods that are LOW GI are high in fiber. Protein rich foods are LOW GI. Foods that are highly processes are usually HIGH GI foods. Most starchy, sugary, snack foods are HIGH GI and should be avoided.     LOW GI (Good choices)  Most vegetables ok (except          potatoes, corn, peas and carrots). Whole fresh fruit like        apples, berries, grapes,                 pineapple (Avoid dries fruit, fruit    juices, watermelon, bananas). High fiber breads). High fiber        breads (>3 grams per slice);         legumes (navy beans, tamayo           beans, kidney beans, lentils,     etc); low-fat dairy products. MEDIUM GI  Whole wheat products, sweet       potato, brown rice, plain oatmeal.       HIGH GI (AVOID)  Corn flakes, Rice Krispies, rice     cakes, popcorn, pretzels, snack    crackers, animal crackers, fig        bars, dried fruits, fruit juices,          white rice, white pastas, bagels,    breads, rolls, white/baked              potatoes, grits, muffins,                 pancakes, cookies, candies,         cakes, sugary drinks, alcohol,        any highly processed sugary        cereal, snack food or        convenience food. ** Eat 4-5 small meals daily. Make sure each mini meal has protein and fiber. Choose foods with a LOW GLYCEMIC INDEX and you can avoid symptoms of REACTIVE HYPOGLYCEMIA and feel better! TIP #1     Treatment of REACTIVE hypoglycemia is an ongoing process;         remember to have a good diet with evenly spaced meals   throughout the day. Tip #2   Daily exercise can help stabilize blood sugars and help control the   symptoms of hypoglycemia. Tip #3   Choose protein and fiber rich foods every meal. Lean meats or   seafood, low fat dairy products and colorful vegetables are great,   choices. Tip #4   If you experience a low blood sugar, drink 1/2 cup of skim milk   (fat-free). 1% milk or eat a light yogurt. Ideally, try and eat something with some protein that can be easily ingested. A High protein Slimfast, Low-Carb Wisner Instant Breakfast or Boost Glucose control will also work, as will 1/2 a protein bar.        Tip #5   Avoid alcohol, caffeine, sugar and highly starchy foods such as white rice, bread, crackers, popcorn, potatoes, pretzels, and other snack type foods that are highly processed with very little nutritional benefit (empty calories). Tip #6   Avoid fruit juices and dried fruits. The natural sugar content is very high and will contribute to hypoglycemic episodes.

## 2021-09-21 NOTE — PROGRESS NOTES
HISTORY OF PRESENT ILLNESS  Chiquis Blackwell is a 49 y.o. male with previous Malabsorpative Gastric bypass surgery 3 years ago. [place] [facility] He has lost a total of 84.5 pounds since surgery. He  has lost 15 lbs  since the last ov. Body mass index is 30.86 kg/m². He states that once or twice a month he has an issue with nausea and dry heaves. He takes Zofran which helps. .Denies Acid reflux/heartburn. . Drinking  64 ounces of water daily. 60+ protein intake daily.+ BM's. Pt was walking for exercise. Having a flap on left knee meniscus removed Thursday. In July he passed out and was sent to the [place] for work-up. It was determined that it was possibly his labetalol and dehydration that caused this. They check blood work at the [place] everything was normal.  He states that he has some soreness in his throat and esophagus when he does not wear his CPAP insert. Dietary recall -    Breakfast- toast, banana  Lunch- sandwich  Dinner- taco    He is snacking between meals; cheese and salami. .      Vitamins:  MVI : yes  Calcium : yes  B-Vit 12: yes  Vit D: Yes        Mr. Naye Blackwell has a reminder for a \"due or due soon\" health maintenance. I have asked that he contact his primary care provider for follow-up on this health maintenance. COMORBIDITY     SLEEP APNEA                 YES        GERD  (req.meds)            NO  HYPERLIPIDEMIA            NO  HYPERTENSION              YES         DIABETES                         NO           Current Outpatient Medications:     ondansetron (ZOFRAN ODT) 4 mg disintegrating tablet, Take 1 Tab by mouth every eight (8) hours as needed for Nausea., Disp: 30 Tab, Rfl: 0    chlorthalidone (HYGROTEN) 25 mg tablet, Take  by mouth daily. , Disp: , Rfl:     spironolactone (ALDACTONE) 25 mg tablet, Take  by mouth daily. , Disp: , Rfl:     NASCOBAL 500 mcg/spray spry, 1 Spray every seven (7) days. , Disp: , Rfl:     CALCIUM CITRATE PO, Take 1 Tab by mouth two (2) times a day., Disp: , Rfl:     ferrous sulfate (IRON) 325 mg (65 mg iron) tablet, Take 325 mg by mouth Daily (before breakfast). , Disp: , Rfl:     multivitamin (ONE A DAY) tablet, Take 1 Tab by mouth two (2) times a day., Disp: , Rfl:     lisinopril (PRINIVIL, ZESTRIL) 40 mg tablet, Take 40 mg by mouth daily. , Disp: , Rfl:     amLODIPine (NORVASC) 10 mg tablet, Take 10 mg by mouth daily. , Disp: , Rfl:     ergocalciferol (ERGOCALCIFEROL) 50,000 unit capsule, Take 1 Cap by mouth every seven (7) days. , Disp: 12 Cap, Rfl: 0    labetalol (NORMODYNE) 100 mg tablet, Take 300 mg by mouth two (2) times a day., Disp: , Rfl:     allopurinol (ZYLOPRIM) 300 mg tablet, Take 300 mg by mouth daily. , Disp: , Rfl:     Dexlansoprazole 60 mg CpDB, Take 60 mg by mouth., Disp: , Rfl:     simvastatin (ZOCOR) 10 mg tablet, Take 10 mg by mouth daily. , Disp: , Rfl:     sertraline (ZOLOFT) 100 mg tablet, Take 100 mg by mouth daily. , Disp: , Rfl:     traZODone (DESYREL) 100 mg tablet, Take 100 mg by mouth nightly., Disp: , Rfl:       Visit Vitals  /86   Pulse 85   Temp 100 °F (37.8 °C) (Oral)   Resp 18   Ht 5' 9\" (1.753 m)   Wt 209 lb (94.8 kg)   SpO2 97%   BMI 30.86 kg/m²     HPI    Review of Systems   Respiratory: Negative for shortness of breath. Cardiovascular: Negative for chest pain. Gastrointestinal: Negative for abdominal pain, heartburn, nausea and vomiting. Neurological: Positive for dizziness (when he stands too quickly. ). Negative for headaches. Physical Exam  Constitutional:       Appearance: He is well-developed. HENT:      Mouth/Throat:      Mouth: Mucous membranes are moist.   Cardiovascular:      Rate and Rhythm: Normal rate and regular rhythm. Heart sounds: Normal heart sounds. Pulmonary:      Effort: Pulmonary effort is normal.      Breath sounds: Normal breath sounds. Abdominal:      General: Bowel sounds are normal. There is no distension. Palpations: Abdomen is soft. Tenderness: There is no abdominal tenderness. Comments: No  Masses or hernias noted. Musculoskeletal:         General: Normal range of motion. Cervical back: Normal range of motion. Skin:     General: Skin is warm and dry. Neurological:      Mental Status: He is alert and oriented to person, place, and time. ASSESSMENT and PLAN  Mary Rene is a 49 y.o. male with previous Malabsorpative Gastric bypass surgery 3 years ago. [place] He has lost a total of 84.5 pounds since surgery. He  has lost 15 lbs  since the last ov. Body mass index is 30.86 kg/m². He states that once or twice a month he has an issue with nausea and dry heaves. He takes Zofran which helps. .Denies Acid reflux/heartburn. . Drinking  64 ounces of water daily. 60+ protein intake daily.+ BM's. Pt was walking for exercise. Having a flap on left knee meniscus removed Thursday. In July he passed out and was sent to the [place] for work-up. It was determined that it was possibly his labetalol and dehydration that caused this. They check blood work at the [place] everything was normal.  He states that he has some soreness in his throat and esophagus when he does not wear his CPAP insert. Advised to continue wearing CPAP insert to help with soreness in throat and esophagus. We will refill Zofran to help with days that he has nausea and dry heaves. Continue follow-up with his primary care to manage his labetalol. Careful with changing positions too quickly. On days that he is working out and sweating he should add a Gatorade 0, Powerade 0 or propel 0. We will also give information on reactive hypoglycemia. Advised patient regard to diet that is high-protein, low-fat, low-sugar, limited carbohydrates. Strive for 50-60 grams of protein daily. If having a snack, foods that are protein or fiber rich. . No eating/drinking together, chew foods well, and portion control. Measure meals. Discussed snacking behavior and to [facility] pay attention to behavioral factor and habits.  Drink at least 40-64 ounces of water or non-calorie/non-carbonated beverages daily. Continue vitamin regiment daily. Exercise at least 3 days a week with cardiovascular and strength training. Patient to follow up in 1 year. Advised to call office if any questions/concerns. 17 Minutes spent face to face with patient, >50 % of time spent counseling.

## 2022-03-18 PROBLEM — K44.9 HIATAL HERNIA: Status: ACTIVE | Noted: 2018-02-20

## 2022-03-19 PROBLEM — K21.9 CHRONIC GERD: Status: ACTIVE | Noted: 2017-09-01

## 2022-03-19 PROBLEM — E66.01 MORBID OBESITY WITH BMI OF 40.0-44.9, ADULT (HCC): Status: ACTIVE | Noted: 2017-09-01

## 2022-03-19 PROBLEM — I10 ESSENTIAL HYPERTENSION: Status: ACTIVE | Noted: 2018-02-20

## 2022-03-19 PROBLEM — K22.70 BARRETT'S ESOPHAGUS WITHOUT DYSPLASIA: Status: ACTIVE | Noted: 2018-02-20

## 2022-03-19 PROBLEM — E78.00 HYPERCHOLESTEREMIA: Status: ACTIVE | Noted: 2018-07-04

## 2022-03-20 PROBLEM — Z98.890 STATUS POST NISSEN FUNDOPLICATION: Status: ACTIVE | Noted: 2018-02-20

## 2022-03-20 PROBLEM — G47.33 OSA (OBSTRUCTIVE SLEEP APNEA): Status: ACTIVE | Noted: 2018-02-20

## 2022-03-20 PROBLEM — E66.01 MORBID OBESITY (HCC): Status: ACTIVE | Noted: 2018-07-16

## 2023-05-24 RX ORDER — ERGOCALCIFEROL 1.25 MG/1
50000 CAPSULE ORAL
COMMUNITY
Start: 2018-07-03

## 2023-05-24 RX ORDER — LISINOPRIL 40 MG/1
40 TABLET ORAL DAILY
COMMUNITY

## 2023-05-24 RX ORDER — SIMVASTATIN 10 MG
10 TABLET ORAL DAILY
COMMUNITY

## 2023-05-24 RX ORDER — SERTRALINE HYDROCHLORIDE 100 MG/1
100 TABLET, FILM COATED ORAL DAILY
COMMUNITY

## 2023-05-24 RX ORDER — ALLOPURINOL 300 MG/1
300 TABLET ORAL DAILY
COMMUNITY

## 2023-05-24 RX ORDER — TRAZODONE HYDROCHLORIDE 100 MG/1
100 TABLET ORAL NIGHTLY
COMMUNITY

## 2023-05-24 RX ORDER — SPIRONOLACTONE 25 MG/1
TABLET ORAL DAILY
COMMUNITY

## 2023-05-24 RX ORDER — DEXLANSOPRAZOLE 60 MG/1
60 CAPSULE, DELAYED RELEASE ORAL
COMMUNITY

## 2023-05-24 RX ORDER — LABETALOL 100 MG/1
300 TABLET, FILM COATED ORAL 2 TIMES DAILY
COMMUNITY

## 2023-05-24 RX ORDER — FERROUS SULFATE 325(65) MG
325 TABLET ORAL
COMMUNITY

## 2023-05-24 RX ORDER — AMLODIPINE BESYLATE 10 MG/1
10 TABLET ORAL DAILY
COMMUNITY

## 2023-05-24 RX ORDER — CHLORTHALIDONE 25 MG/1
TABLET ORAL DAILY
COMMUNITY

## 2023-05-24 RX ORDER — CYANOCOBALAMIN 500 UG/1
1 SPRAY NASAL
COMMUNITY
Start: 2018-07-03

## 2023-05-24 RX ORDER — ONDANSETRON 4 MG/1
4 TABLET, ORALLY DISINTEGRATING ORAL EVERY 8 HOURS PRN
COMMUNITY
Start: 2021-09-21

## 2023-07-20 ENCOUNTER — TELEPHONE (OUTPATIENT)
Age: 53
End: 2023-07-20

## 2023-07-20 NOTE — TELEPHONE ENCOUNTER
LM for pt to call and schedule annual bariatric exam. Letter sent.  [facility]

## (undated) DEVICE — SUTURE MCRYL SZ 4-0 L27IN ABSRB UD L19MM PS-2 1/2 CIR PRIM Y426H

## (undated) DEVICE — REM POLYHESIVE ADULT PATIENT RETURN ELECTRODE: Brand: VALLEYLAB

## (undated) DEVICE — POWER SHELL: Brand: SIGNIA

## (undated) DEVICE — STAPLER INT 60 UNIV PUR W/ TRI-STAPLE TECHNOLOGY ULT FOR

## (undated) DEVICE — REINFORCED INTELLIGENT RELOAD, FOR USE WITH SIGNIA STAPLING SYSTEM: Brand: TRI-STAPLE 2.0

## (undated) DEVICE — MEDI-VAC NON-CONDUCTIVE SUCTION TUBING: Brand: CARDINAL HEALTH

## (undated) DEVICE — STERILE POLYISOPRENE POWDER-FREE SURGICAL GLOVES WITH EMOLLIENT COATING: Brand: PROTEXIS

## (undated) DEVICE — ARTICULATING RELOAD WITH TRI-STAPLE TECHNOLOGY: Brand: ENDO GIA

## (undated) DEVICE — SUTURE SZ 0 27IN 5/8 CIR UR-6  TAPER PT VIOLET ABSRB VICRYL J603H

## (undated) DEVICE — (D)PREP SKN CHLRAPRP APPL 26ML -- CONVERT TO ITEM 371833

## (undated) DEVICE — BLADELESS OPTICAL TROCAR WITH FIXATION CANNULA: Brand: VERSAPORT

## (undated) DEVICE — VISUALIZATION SYSTEM: Brand: CLEARIFY

## (undated) DEVICE — DEVICE SUT 0 L48IN GRN POLY BRAID LD UNIT DISP SURGDAC

## (undated) DEVICE — 39" SINGLE PATIENT USE HOVERMATT BREATHABLE: Brand: SINGLE PATIENT USE HOVERMATT

## (undated) DEVICE — KENDALL SCD EXPRESS SLEEVES, KNEE LENGTH, MEDIUM: Brand: KENDALL SCD

## (undated) DEVICE — SUT ETHLN 2-0 18IN FS BLK --

## (undated) DEVICE — Device

## (undated) DEVICE — 1200 GUARD II KIT W/5MM TUBE W/O VAC TUBE: Brand: GUARDIAN

## (undated) DEVICE — INFECTION CONTROL KIT SYS

## (undated) DEVICE — ENDO CARRY-ON PROCEDURE KIT INCLUDES ENZYMATIC SPONGE, GAUZE, BIOHAZARD LABEL, TRAY, LUBRICANT, DIRTY SCOPE LABEL, WATER LABEL, TRAY, DRAWSTRING PAD, AND DEFENDO 4-PIECE KIT.: Brand: ENDO CARRY-ON PROCEDURE KIT

## (undated) DEVICE — NEEDLE HYPO 22GA L1.5IN BLK S STL HUB POLYPR SHLD REG BVL

## (undated) DEVICE — TRAY CATH OD16FR SIL URIN M STATLOK STBL DEV SURSTP

## (undated) DEVICE — SHEAR HARMONIC 5MMX45CM -- ACE 7+

## (undated) DEVICE — Z INACTIVE USE 2240337 DRAPE SURG PT TRANSFER TRAWAY SHT

## (undated) DEVICE — FILTER SMK EVAC FLO CLR MEGADYNE

## (undated) DEVICE — UNIVERSAL FIXATION CANNULA: Brand: VERSAONE

## (undated) DEVICE — DEVON™ KNEE AND BODY STRAP 60" X 3" (1.5 M X 7.6 CM): Brand: DEVON

## (undated) DEVICE — BLADELESS TROCAR WITH FIXATION CANNULA: Brand: VERSAPORT PLUS

## (undated) DEVICE — WOUND RETRACTOR AND PROTECTOR: Brand: ALEXIS WOUND PROTECTOR-RETRACTOR

## (undated) DEVICE — LAP-BAND SYSTEM CALIBRATION TUBE: Brand: LAP-BAND

## (undated) DEVICE — SOLUTION IRRIG 1000ML H2O STRL BLT

## (undated) DEVICE — TROCAR SITE CLOSURE DEVICE: Brand: ENDO CLOSE

## (undated) DEVICE — BLADELESS OPTICAL TROCAR WITH FIXATION CANNULA: Brand: VERSAONE

## (undated) DEVICE — ELECTRODE ES 36CM LAP FLAT L HK COAT DISP CLEANCOAT

## (undated) DEVICE — GOWN,SIRUS,FABRNF,XL,20/CS: Brand: MEDLINE

## (undated) DEVICE — APPLICATOR BNDG 1MM ADH PREMIERPRO EXOFIN

## (undated) DEVICE — 3000CC GUARDIAN II: Brand: GUARDIAN

## (undated) DEVICE — SURGICAL PROCEDURE KIT GEN LAPAROSCOPY LF

## (undated) DEVICE — SPECIMEN RETRIEVAL POUCH: Brand: ENDO CATCH GOLD

## (undated) DEVICE — SUTURING DEVICE: Brand: ENDO STITCH

## (undated) DEVICE — DEVICE SUT VLT PRELD W/ POLYSRB 2-0 L48IN SUT DISP FOR LAP

## (undated) DEVICE — SOLUTION IV 1000ML 0.9% SOD CHL

## (undated) DEVICE — CLICKLINE SCISSORS INSERT: Brand: CLICKLINE

## (undated) DEVICE — DRAIN CHN 19FR L0.25MM DIA6.3MM SIL RND HUBLESS FULL FLUT

## (undated) DEVICE — TUBING INSUFLTN 10FT LUER -- CONVERT TO ITEM 368568